# Patient Record
Sex: FEMALE | Race: WHITE | NOT HISPANIC OR LATINO
[De-identification: names, ages, dates, MRNs, and addresses within clinical notes are randomized per-mention and may not be internally consistent; named-entity substitution may affect disease eponyms.]

---

## 2017-07-12 ENCOUNTER — APPOINTMENT (OUTPATIENT)
Dept: ANTEPARTUM | Facility: CLINIC | Age: 32
End: 2017-07-12

## 2017-07-12 ENCOUNTER — ASOB RESULT (OUTPATIENT)
Age: 32
End: 2017-07-12

## 2017-11-25 ENCOUNTER — OUTPATIENT (OUTPATIENT)
Dept: OUTPATIENT SERVICES | Facility: HOSPITAL | Age: 32
LOS: 1 days | End: 2017-11-25
Payer: COMMERCIAL

## 2017-11-25 DIAGNOSIS — O26.899 OTHER SPECIFIED PREGNANCY RELATED CONDITIONS, UNSPECIFIED TRIMESTER: ICD-10-CM

## 2017-11-25 DIAGNOSIS — Z3A.00 WEEKS OF GESTATION OF PREGNANCY NOT SPECIFIED: ICD-10-CM

## 2017-11-25 PROCEDURE — 59025 FETAL NON-STRESS TEST: CPT

## 2017-11-25 PROCEDURE — G0463: CPT

## 2017-11-29 ENCOUNTER — INPATIENT (INPATIENT)
Facility: HOSPITAL | Age: 32
LOS: 2 days | Discharge: HOME | End: 2017-12-02
Attending: OBSTETRICS & GYNECOLOGY | Admitting: OBSTETRICS & GYNECOLOGY
Payer: COMMERCIAL

## 2017-11-29 VITALS — HEIGHT: 67 IN | WEIGHT: 158.73 LBS

## 2017-11-29 DIAGNOSIS — Z34.80 ENCOUNTER FOR SUPERVISION OF OTHER NORMAL PREGNANCY, UNSPECIFIED TRIMESTER: ICD-10-CM

## 2017-11-29 DIAGNOSIS — Z3A.00 WEEKS OF GESTATION OF PREGNANCY NOT SPECIFIED: ICD-10-CM

## 2017-11-29 DIAGNOSIS — O26.899 OTHER SPECIFIED PREGNANCY RELATED CONDITIONS, UNSPECIFIED TRIMESTER: ICD-10-CM

## 2017-11-29 LAB
BASOPHILS # BLD AUTO: 0 K/UL — SIGNIFICANT CHANGE UP (ref 0–0.2)
BASOPHILS NFR BLD AUTO: 0.2 % — SIGNIFICANT CHANGE UP (ref 0–2)
BLD GP AB SCN SERPL QL: NEGATIVE — SIGNIFICANT CHANGE UP
EOSINOPHIL # BLD AUTO: 0 K/UL — SIGNIFICANT CHANGE UP (ref 0–0.5)
EOSINOPHIL NFR BLD AUTO: 0.3 % — SIGNIFICANT CHANGE UP (ref 0–6)
HCT VFR BLD CALC: 43.7 % — SIGNIFICANT CHANGE UP (ref 34.5–45)
HGB BLD-MCNC: 15.3 G/DL — SIGNIFICANT CHANGE UP (ref 11.5–15.5)
LYMPHOCYTES # BLD AUTO: 1.7 K/UL — SIGNIFICANT CHANGE UP (ref 1–3.3)
LYMPHOCYTES # BLD AUTO: 9.4 % — LOW (ref 13–44)
MCHC RBC-ENTMCNC: 32.7 PG — SIGNIFICANT CHANGE UP (ref 27–34)
MCHC RBC-ENTMCNC: 35 GM/DL — SIGNIFICANT CHANGE UP (ref 32–36)
MCV RBC AUTO: 93.6 FL — SIGNIFICANT CHANGE UP (ref 80–100)
MONOCYTES # BLD AUTO: 1.3 K/UL — HIGH (ref 0–0.9)
MONOCYTES NFR BLD AUTO: 7.3 % — SIGNIFICANT CHANGE UP (ref 2–14)
NEUTROPHILS # BLD AUTO: 15.1 K/UL — HIGH (ref 1.8–7.4)
NEUTROPHILS NFR BLD AUTO: 82.8 % — HIGH (ref 43–77)
PLATELET # BLD AUTO: 183 K/UL — SIGNIFICANT CHANGE UP (ref 150–400)
RBC # BLD: 4.67 M/UL — SIGNIFICANT CHANGE UP (ref 3.8–5.2)
RBC # FLD: 11.8 % — SIGNIFICANT CHANGE UP (ref 10.3–14.5)
RH IG SCN BLD-IMP: POSITIVE — SIGNIFICANT CHANGE UP
WBC # BLD: 18.2 K/UL — HIGH (ref 3.8–10.5)
WBC # FLD AUTO: 18.2 K/UL — HIGH (ref 3.8–10.5)

## 2017-11-29 RX ORDER — SODIUM CHLORIDE 9 MG/ML
500 INJECTION, SOLUTION INTRAVENOUS ONCE
Qty: 0 | Refills: 0 | Status: COMPLETED | OUTPATIENT
Start: 2017-11-29 | End: 2017-11-29

## 2017-11-29 RX ORDER — OXYTOCIN 10 UNIT/ML
333.33 VIAL (ML) INJECTION
Qty: 20 | Refills: 0 | Status: COMPLETED | OUTPATIENT
Start: 2017-11-29

## 2017-11-29 RX ORDER — CITRIC ACID/SODIUM CITRATE 300-500 MG
15 SOLUTION, ORAL ORAL EVERY 4 HOURS
Qty: 0 | Refills: 0 | Status: DISCONTINUED | OUTPATIENT
Start: 2017-11-29 | End: 2017-11-30

## 2017-11-29 RX ORDER — SODIUM CHLORIDE 9 MG/ML
1000 INJECTION, SOLUTION INTRAVENOUS
Qty: 0 | Refills: 0 | Status: DISCONTINUED | OUTPATIENT
Start: 2017-11-29 | End: 2017-11-30

## 2017-11-29 RX ORDER — OXYTOCIN 10 UNIT/ML
333.33 VIAL (ML) INJECTION
Qty: 20 | Refills: 0 | Status: COMPLETED | OUTPATIENT
Start: 2017-11-29 | End: 2017-11-29

## 2017-11-29 RX ADMIN — SODIUM CHLORIDE 125 MILLILITER(S): 9 INJECTION, SOLUTION INTRAVENOUS at 19:55

## 2017-11-29 RX ADMIN — SODIUM CHLORIDE 1000 MILLILITER(S): 9 INJECTION, SOLUTION INTRAVENOUS at 19:10

## 2017-11-29 RX ADMIN — SODIUM CHLORIDE 125 MILLILITER(S): 9 INJECTION, SOLUTION INTRAVENOUS at 19:40

## 2017-11-30 LAB
APTT BLD: 25.6 SEC — LOW (ref 27.5–37.4)
BASOPHILS # BLD AUTO: 0.1 K/UL — SIGNIFICANT CHANGE UP (ref 0–0.2)
EOSINOPHIL # BLD AUTO: 0 K/UL — SIGNIFICANT CHANGE UP (ref 0–0.5)
FIBRINOGEN PPP-MCNC: 567 MG/DL — HIGH (ref 310–510)
HCT VFR BLD CALC: 31.3 % — LOW (ref 34.5–45)
HCT VFR BLD CALC: 34.2 % — LOW (ref 34.5–45)
HCT VFR BLD CALC: 35.4 % — SIGNIFICANT CHANGE UP (ref 34.5–45)
HGB BLD-MCNC: 11.2 G/DL — LOW (ref 11.5–15.5)
HGB BLD-MCNC: 11.6 G/DL — SIGNIFICANT CHANGE UP (ref 11.5–15.5)
HGB BLD-MCNC: 11.9 G/DL — SIGNIFICANT CHANGE UP (ref 11.5–15.5)
INR BLD: 1.05 RATIO — SIGNIFICANT CHANGE UP (ref 0.88–1.16)
LACTATE SERPL-SCNC: 2.2 MMOL/L — HIGH (ref 0.7–2)
LYMPHOCYTES # BLD AUTO: 1.2 K/UL — SIGNIFICANT CHANGE UP (ref 1–3.3)
LYMPHOCYTES # BLD AUTO: 8 % — LOW (ref 13–44)
MCHC RBC-ENTMCNC: 31.4 PG — SIGNIFICANT CHANGE UP (ref 27–34)
MCHC RBC-ENTMCNC: 31.6 PG — SIGNIFICANT CHANGE UP (ref 27–34)
MCHC RBC-ENTMCNC: 33.4 PG — SIGNIFICANT CHANGE UP (ref 27–34)
MCHC RBC-ENTMCNC: 33.6 GM/DL — SIGNIFICANT CHANGE UP (ref 32–36)
MCHC RBC-ENTMCNC: 33.9 GM/DL — SIGNIFICANT CHANGE UP (ref 32–36)
MCHC RBC-ENTMCNC: 35.7 GM/DL — SIGNIFICANT CHANGE UP (ref 32–36)
MCV RBC AUTO: 93.3 FL — SIGNIFICANT CHANGE UP (ref 80–100)
MCV RBC AUTO: 93.4 FL — SIGNIFICANT CHANGE UP (ref 80–100)
MCV RBC AUTO: 93.5 FL — SIGNIFICANT CHANGE UP (ref 80–100)
MONOCYTES # BLD AUTO: 2.1 K/UL — HIGH (ref 0–0.9)
MONOCYTES NFR BLD AUTO: 6 % — SIGNIFICANT CHANGE UP (ref 2–14)
NEUTROPHILS # BLD AUTO: 20.1 K/UL — HIGH (ref 1.8–7.4)
NEUTROPHILS NFR BLD AUTO: 84 % — HIGH (ref 43–77)
PLATELET # BLD AUTO: 150 K/UL — SIGNIFICANT CHANGE UP (ref 150–400)
PLATELET # BLD AUTO: 154 K/UL — SIGNIFICANT CHANGE UP (ref 150–400)
PLATELET # BLD AUTO: 154 K/UL — SIGNIFICANT CHANGE UP (ref 150–400)
PROTHROM AB SERPL-ACNC: 11.3 SEC — SIGNIFICANT CHANGE UP (ref 9.8–12.7)
RBC # BLD: 3.35 M/UL — LOW (ref 3.8–5.2)
RBC # BLD: 3.66 M/UL — LOW (ref 3.8–5.2)
RBC # BLD: 3.79 M/UL — LOW (ref 3.8–5.2)
RBC # FLD: 11.6 % — SIGNIFICANT CHANGE UP (ref 10.3–14.5)
RBC # FLD: 11.7 % — SIGNIFICANT CHANGE UP (ref 10.3–14.5)
RBC # FLD: 11.7 % — SIGNIFICANT CHANGE UP (ref 10.3–14.5)
T PALLIDUM AB TITR SER: NEGATIVE — SIGNIFICANT CHANGE UP
WBC # BLD: 22.2 K/UL — HIGH (ref 3.8–10.5)
WBC # BLD: 23 K/UL — HIGH (ref 3.8–10.5)
WBC # BLD: 23.3 K/UL — HIGH (ref 3.8–10.5)
WBC # FLD AUTO: 22.2 K/UL — HIGH (ref 3.8–10.5)
WBC # FLD AUTO: 23 K/UL — HIGH (ref 3.8–10.5)
WBC # FLD AUTO: 23.3 K/UL — HIGH (ref 3.8–10.5)

## 2017-11-30 RX ORDER — CARBOPROST TROMETHAMINE 250 UG/ML
250 INJECTION, SOLUTION INTRAMUSCULAR ONCE
Qty: 0 | Refills: 0 | Status: COMPLETED | OUTPATIENT
Start: 2017-11-30 | End: 2017-11-30

## 2017-11-30 RX ORDER — AER TRAVELER 0.5 G/1
1 SOLUTION RECTAL; TOPICAL EVERY 4 HOURS
Qty: 0 | Refills: 0 | Status: DISCONTINUED | OUTPATIENT
Start: 2017-11-30 | End: 2017-11-30

## 2017-11-30 RX ORDER — LANOLIN
1 OINTMENT (GRAM) TOPICAL EVERY 6 HOURS
Qty: 0 | Refills: 0 | Status: DISCONTINUED | OUTPATIENT
Start: 2017-11-30 | End: 2017-12-02

## 2017-11-30 RX ORDER — OXYCODONE HYDROCHLORIDE 5 MG/1
5 TABLET ORAL
Qty: 0 | Refills: 0 | Status: DISCONTINUED | OUTPATIENT
Start: 2017-11-30 | End: 2017-12-02

## 2017-11-30 RX ORDER — PRAMOXINE HYDROCHLORIDE 150 MG/15G
1 AEROSOL, FOAM RECTAL EVERY 4 HOURS
Qty: 0 | Refills: 0 | Status: DISCONTINUED | OUTPATIENT
Start: 2017-11-30 | End: 2017-11-30

## 2017-11-30 RX ORDER — DOCUSATE SODIUM 100 MG
100 CAPSULE ORAL
Qty: 0 | Refills: 0 | Status: DISCONTINUED | OUTPATIENT
Start: 2017-11-30 | End: 2017-12-02

## 2017-11-30 RX ORDER — HYDROCORTISONE 1 %
1 OINTMENT (GRAM) TOPICAL EVERY 4 HOURS
Qty: 0 | Refills: 0 | Status: DISCONTINUED | OUTPATIENT
Start: 2017-11-30 | End: 2017-12-02

## 2017-11-30 RX ORDER — OXYTOCIN 10 UNIT/ML
41.67 VIAL (ML) INJECTION
Qty: 20 | Refills: 0 | Status: DISCONTINUED | OUTPATIENT
Start: 2017-11-30 | End: 2017-12-02

## 2017-11-30 RX ORDER — PRAMOXINE HYDROCHLORIDE 150 MG/15G
1 AEROSOL, FOAM RECTAL EVERY 4 HOURS
Qty: 0 | Refills: 0 | Status: DISCONTINUED | OUTPATIENT
Start: 2017-11-30 | End: 2017-12-02

## 2017-11-30 RX ORDER — ACETAMINOPHEN 500 MG
975 TABLET ORAL EVERY 6 HOURS
Qty: 0 | Refills: 0 | Status: DISCONTINUED | OUTPATIENT
Start: 2017-11-30 | End: 2017-12-02

## 2017-11-30 RX ORDER — DIPHENHYDRAMINE HCL 50 MG
25 CAPSULE ORAL EVERY 6 HOURS
Qty: 0 | Refills: 0 | Status: DISCONTINUED | OUTPATIENT
Start: 2017-11-30 | End: 2017-12-02

## 2017-11-30 RX ORDER — SODIUM CHLORIDE 9 MG/ML
3 INJECTION INTRAMUSCULAR; INTRAVENOUS; SUBCUTANEOUS EVERY 8 HOURS
Qty: 0 | Refills: 0 | Status: DISCONTINUED | OUTPATIENT
Start: 2017-11-30 | End: 2017-11-30

## 2017-11-30 RX ORDER — AER TRAVELER 0.5 G/1
1 SOLUTION RECTAL; TOPICAL EVERY 4 HOURS
Qty: 0 | Refills: 0 | Status: DISCONTINUED | OUTPATIENT
Start: 2017-11-30 | End: 2017-12-02

## 2017-11-30 RX ORDER — HYDROCORTISONE 1 %
1 OINTMENT (GRAM) TOPICAL EVERY 4 HOURS
Qty: 0 | Refills: 0 | Status: DISCONTINUED | OUTPATIENT
Start: 2017-11-30 | End: 2017-11-30

## 2017-11-30 RX ORDER — KETOROLAC TROMETHAMINE 30 MG/ML
30 SYRINGE (ML) INJECTION ONCE
Qty: 0 | Refills: 0 | Status: DISCONTINUED | OUTPATIENT
Start: 2017-11-30 | End: 2017-12-02

## 2017-11-30 RX ORDER — ONDANSETRON 8 MG/1
4 TABLET, FILM COATED ORAL ONCE
Qty: 0 | Refills: 0 | Status: DISCONTINUED | OUTPATIENT
Start: 2017-11-30 | End: 2017-12-02

## 2017-11-30 RX ORDER — SIMETHICONE 80 MG/1
80 TABLET, CHEWABLE ORAL EVERY 6 HOURS
Qty: 0 | Refills: 0 | Status: DISCONTINUED | OUTPATIENT
Start: 2017-11-30 | End: 2017-12-02

## 2017-11-30 RX ORDER — GLYCERIN ADULT
1 SUPPOSITORY, RECTAL RECTAL AT BEDTIME
Qty: 0 | Refills: 0 | Status: DISCONTINUED | OUTPATIENT
Start: 2017-11-30 | End: 2017-12-02

## 2017-11-30 RX ORDER — DIBUCAINE 1 %
1 OINTMENT (GRAM) RECTAL EVERY 4 HOURS
Qty: 0 | Refills: 0 | Status: DISCONTINUED | OUTPATIENT
Start: 2017-11-30 | End: 2017-12-02

## 2017-11-30 RX ORDER — OXYCODONE HYDROCHLORIDE 5 MG/1
5 TABLET ORAL EVERY 4 HOURS
Qty: 0 | Refills: 0 | Status: DISCONTINUED | OUTPATIENT
Start: 2017-11-30 | End: 2017-12-02

## 2017-11-30 RX ORDER — ACETAMINOPHEN 500 MG
975 TABLET ORAL EVERY 6 HOURS
Qty: 0 | Refills: 0 | Status: COMPLETED | OUTPATIENT
Start: 2017-11-30 | End: 2018-10-29

## 2017-11-30 RX ORDER — DIPHENOXYLATE HCL/ATROPINE 2.5-.025MG
1 TABLET ORAL ONCE
Qty: 0 | Refills: 0 | Status: DISCONTINUED | OUTPATIENT
Start: 2017-11-30 | End: 2017-11-30

## 2017-11-30 RX ORDER — DIBUCAINE 1 %
1 OINTMENT (GRAM) RECTAL EVERY 4 HOURS
Qty: 0 | Refills: 0 | Status: DISCONTINUED | OUTPATIENT
Start: 2017-11-30 | End: 2017-11-30

## 2017-11-30 RX ORDER — SODIUM CHLORIDE 9 MG/ML
1000 INJECTION, SOLUTION INTRAVENOUS ONCE
Qty: 0 | Refills: 0 | Status: COMPLETED | OUTPATIENT
Start: 2017-11-30 | End: 2017-11-30

## 2017-11-30 RX ORDER — TETANUS TOXOID, REDUCED DIPHTHERIA TOXOID AND ACELLULAR PERTUSSIS VACCINE, ADSORBED 5; 2.5; 8; 8; 2.5 [IU]/.5ML; [IU]/.5ML; UG/.5ML; UG/.5ML; UG/.5ML
0.5 SUSPENSION INTRAMUSCULAR ONCE
Qty: 0 | Refills: 0 | Status: DISCONTINUED | OUTPATIENT
Start: 2017-11-30 | End: 2017-12-02

## 2017-11-30 RX ORDER — SODIUM CHLORIDE 9 MG/ML
3 INJECTION INTRAMUSCULAR; INTRAVENOUS; SUBCUTANEOUS EVERY 8 HOURS
Qty: 0 | Refills: 0 | Status: DISCONTINUED | OUTPATIENT
Start: 2017-11-30 | End: 2017-12-02

## 2017-11-30 RX ORDER — IBUPROFEN 200 MG
600 TABLET ORAL EVERY 6 HOURS
Qty: 0 | Refills: 0 | Status: COMPLETED | OUTPATIENT
Start: 2017-11-30 | End: 2018-10-29

## 2017-11-30 RX ORDER — MAGNESIUM HYDROXIDE 400 MG/1
30 TABLET, CHEWABLE ORAL
Qty: 0 | Refills: 0 | Status: DISCONTINUED | OUTPATIENT
Start: 2017-11-30 | End: 2017-12-02

## 2017-11-30 RX ORDER — TRANEXAMIC ACID 100 MG/ML
1000 INJECTION, SOLUTION INTRAVENOUS ONCE
Qty: 0 | Refills: 0 | Status: COMPLETED | OUTPATIENT
Start: 2017-11-30 | End: 2017-11-30

## 2017-11-30 RX ADMIN — Medication 0.2 MILLIGRAM(S): at 23:19

## 2017-11-30 RX ADMIN — Medication 975 MILLIGRAM(S): at 18:49

## 2017-11-30 RX ADMIN — Medication 125 MILLIUNIT(S)/MIN: at 01:50

## 2017-11-30 RX ADMIN — Medication 0.2 MILLIGRAM(S): at 15:06

## 2017-11-30 RX ADMIN — Medication 1 TABLET(S): at 04:08

## 2017-11-30 RX ADMIN — OXYCODONE HYDROCHLORIDE 5 MILLIGRAM(S): 5 TABLET ORAL at 09:30

## 2017-11-30 RX ADMIN — OXYCODONE HYDROCHLORIDE 5 MILLIGRAM(S): 5 TABLET ORAL at 12:45

## 2017-11-30 RX ADMIN — Medication 1000 MILLIUNIT(S)/MIN: at 01:10

## 2017-11-30 RX ADMIN — Medication 0.2 MILLIGRAM(S): at 10:48

## 2017-11-30 RX ADMIN — OXYCODONE HYDROCHLORIDE 5 MILLIGRAM(S): 5 TABLET ORAL at 12:15

## 2017-11-30 RX ADMIN — OXYCODONE HYDROCHLORIDE 5 MILLIGRAM(S): 5 TABLET ORAL at 22:00

## 2017-11-30 RX ADMIN — OXYCODONE HYDROCHLORIDE 5 MILLIGRAM(S): 5 TABLET ORAL at 18:55

## 2017-11-30 RX ADMIN — Medication 975 MILLIGRAM(S): at 02:07

## 2017-11-30 RX ADMIN — CARBOPROST TROMETHAMINE 250 MICROGRAM(S): 250 INJECTION, SOLUTION INTRAMUSCULAR at 03:26

## 2017-11-30 RX ADMIN — OXYCODONE HYDROCHLORIDE 5 MILLIGRAM(S): 5 TABLET ORAL at 02:54

## 2017-11-30 RX ADMIN — TRANEXAMIC ACID 220 MILLIGRAM(S): 100 INJECTION, SOLUTION INTRAVENOUS at 04:28

## 2017-11-30 RX ADMIN — SODIUM CHLORIDE 3 MILLILITER(S): 9 INJECTION INTRAMUSCULAR; INTRAVENOUS; SUBCUTANEOUS at 23:19

## 2017-11-30 RX ADMIN — Medication 0.2 MILLIGRAM(S): at 18:55

## 2017-11-30 RX ADMIN — OXYCODONE HYDROCHLORIDE 5 MILLIGRAM(S): 5 TABLET ORAL at 08:58

## 2017-11-30 RX ADMIN — SODIUM CHLORIDE 2000 MILLILITER(S): 9 INJECTION, SOLUTION INTRAVENOUS at 03:50

## 2017-11-30 RX ADMIN — OXYCODONE HYDROCHLORIDE 5 MILLIGRAM(S): 5 TABLET ORAL at 15:46

## 2017-11-30 RX ADMIN — OXYCODONE HYDROCHLORIDE 5 MILLIGRAM(S): 5 TABLET ORAL at 21:28

## 2017-11-30 RX ADMIN — PRAMOXINE HYDROCHLORIDE 1 APPLICATION(S): 150 AEROSOL, FOAM RECTAL at 17:50

## 2017-11-30 RX ADMIN — Medication 0.2 MILLIGRAM(S): at 06:53

## 2017-11-30 RX ADMIN — Medication 0.2 MILLIGRAM(S): at 02:47

## 2017-11-30 NOTE — CHART NOTE - NSCHARTNOTEFT_GEN_A_CORE
Pt seen and examined for extra vaginal bleeding. Pt without complaints at this time.    VS  T(C): 38.3 (17 @ 01:40)  HR: 108 (17 @ 01:40)  BP: 132/67 (17 @ 01:40)  RR: 18 (17 @ 01:40)  SpO2: 98% (17 @ 01:40)    Physical Exam:  General: NAD  Abdomen/Pelvic: Soft, non-tender, non-distended, fundus originally slightly above umbilicus, internal exam performed and clots expressed, total EBL from exam 300 mL, fundus noted to be firm and slightly below umbilicus after clot expression  Sono: Thin endometrial stripe with no evidence of retained products after examination    A/P:  Recently s/p  with EBL of 350, now with 300 mL PPH for total EBL of 650 mL  - Methergine 0.2 mg IM given now  - Will continue with methergine series for 24 hours  - Continue to monitor    d/w Dr. Worthington-Marito Willis, PGY-2

## 2017-11-30 NOTE — PROVIDER CONTACT NOTE (OTHER) - BACKGROUND
EBL- 350 ml, with additional ebl-300 ml and methergine series.
EBL=350 mL at delivery. Pitocin Bag #2.

## 2017-11-30 NOTE — PROVIDER CONTACT NOTE (OTHER) - ASSESSMENT
uterus midline, 1 below. 3-4 golf ball sized clots expressed with moderate bleeding.
pt midline fundus firm on palpation, pt sitting in pool of blood.
uterus midline and firm on palpation, pt appears to be sitting in pool of blood.

## 2017-12-01 LAB
HCT VFR BLD CALC: 26.5 % — LOW (ref 34.5–45)
HGB BLD-MCNC: 9.2 G/DL — LOW (ref 11.5–15.5)
LACTATE SERPL-SCNC: 1 MMOL/L — SIGNIFICANT CHANGE UP (ref 0.7–2)

## 2017-12-01 RX ORDER — ASCORBIC ACID 60 MG
500 TABLET,CHEWABLE ORAL DAILY
Qty: 0 | Refills: 0 | Status: DISCONTINUED | OUTPATIENT
Start: 2017-12-01 | End: 2017-12-02

## 2017-12-01 RX ORDER — FERROUS SULFATE 325(65) MG
325 TABLET ORAL
Qty: 0 | Refills: 0 | Status: DISCONTINUED | OUTPATIENT
Start: 2017-12-01 | End: 2017-12-02

## 2017-12-01 RX ORDER — IBUPROFEN 200 MG
600 TABLET ORAL EVERY 6 HOURS
Qty: 0 | Refills: 0 | Status: DISCONTINUED | OUTPATIENT
Start: 2017-12-01 | End: 2017-12-02

## 2017-12-01 RX ORDER — DOCUSATE SODIUM 100 MG
100 CAPSULE ORAL THREE TIMES A DAY
Qty: 0 | Refills: 0 | Status: DISCONTINUED | OUTPATIENT
Start: 2017-12-01 | End: 2017-12-02

## 2017-12-01 RX ADMIN — Medication 975 MILLIGRAM(S): at 13:00

## 2017-12-01 RX ADMIN — Medication 100 MILLIGRAM(S): at 15:18

## 2017-12-01 RX ADMIN — SODIUM CHLORIDE 3 MILLILITER(S): 9 INJECTION INTRAMUSCULAR; INTRAVENOUS; SUBCUTANEOUS at 05:27

## 2017-12-01 RX ADMIN — OXYCODONE HYDROCHLORIDE 5 MILLIGRAM(S): 5 TABLET ORAL at 18:03

## 2017-12-01 RX ADMIN — Medication 975 MILLIGRAM(S): at 12:22

## 2017-12-01 RX ADMIN — Medication 500 MILLIGRAM(S): at 12:24

## 2017-12-01 RX ADMIN — OXYCODONE HYDROCHLORIDE 5 MILLIGRAM(S): 5 TABLET ORAL at 09:30

## 2017-12-01 RX ADMIN — OXYCODONE HYDROCHLORIDE 5 MILLIGRAM(S): 5 TABLET ORAL at 01:30

## 2017-12-01 RX ADMIN — OXYCODONE HYDROCHLORIDE 5 MILLIGRAM(S): 5 TABLET ORAL at 18:39

## 2017-12-01 RX ADMIN — OXYCODONE HYDROCHLORIDE 5 MILLIGRAM(S): 5 TABLET ORAL at 12:21

## 2017-12-01 RX ADMIN — Medication 975 MILLIGRAM(S): at 01:30

## 2017-12-01 RX ADMIN — Medication 975 MILLIGRAM(S): at 06:00

## 2017-12-01 RX ADMIN — Medication 1 TABLET(S): at 12:22

## 2017-12-01 RX ADMIN — OXYCODONE HYDROCHLORIDE 5 MILLIGRAM(S): 5 TABLET ORAL at 09:05

## 2017-12-01 RX ADMIN — Medication 975 MILLIGRAM(S): at 18:03

## 2017-12-01 RX ADMIN — Medication 975 MILLIGRAM(S): at 05:23

## 2017-12-01 RX ADMIN — Medication 100 MILLIGRAM(S): at 05:25

## 2017-12-01 RX ADMIN — OXYCODONE HYDROCHLORIDE 5 MILLIGRAM(S): 5 TABLET ORAL at 13:00

## 2017-12-01 RX ADMIN — Medication 325 MILLIGRAM(S): at 12:22

## 2017-12-01 RX ADMIN — Medication 975 MILLIGRAM(S): at 00:48

## 2017-12-01 RX ADMIN — OXYCODONE HYDROCHLORIDE 5 MILLIGRAM(S): 5 TABLET ORAL at 05:24

## 2017-12-01 RX ADMIN — Medication 975 MILLIGRAM(S): at 18:39

## 2017-12-01 RX ADMIN — OXYCODONE HYDROCHLORIDE 5 MILLIGRAM(S): 5 TABLET ORAL at 06:00

## 2017-12-01 RX ADMIN — Medication 325 MILLIGRAM(S): at 18:03

## 2017-12-01 RX ADMIN — OXYCODONE HYDROCHLORIDE 5 MILLIGRAM(S): 5 TABLET ORAL at 00:48

## 2017-12-01 NOTE — CHART NOTE - NSCHARTNOTEFT_GEN_A_CORE
DELIA ACHARYA Postpartum    Day 1         Vital Signs Last 24 Hrs  T(C): 36.7 (01 Dec 2017 17:19), Max: 37.2 (2017 18:28)  T(F): 98.1 (01 Dec 2017 17:19), Max: 99 (2017 18:28)  HR: 79 (01 Dec 2017 17:19) (75 - 112)  BP: 125/84 (01 Dec 2017 17:19) (107/74 - 125/84)  RR: 18 (01 Dec 2017 17:19) (18 - 18)  SpO2: 99% (01 Dec 2017 17:19) (98% - 100%)                          9.2    x     )-----------( x        ( 01 Dec 2017 07:06 )             26.5   baso x      eos x      imm gran x      lymph x      mono x      poly x                            11.2   23.0  )-----------( 154      ( 2017 10:35 )             31.3   baso x      eos x      imm gran x      lymph x      mono x      poly x                            11.9   23.3  )-----------( 154      ( 2017 04:03 )             35.4   baso x      eos x      imm gran x      lymph x      mono x      poly x                            11.6   22.2  )-----------( 150      ( 2017 03:37 )             34.2   baso x      eos x      imm gran x      lymph 8.0    mono 6.0    poly 84.0                         15.3   18.2  )-----------( 183      ( 2017 19:38 )             43.7   baso 0.2    eos 0.3    imm gran x      lymph 9.4    mono 7.3    poly 82.8         Plan:  - Ferrous Sulfate, Colace, Vitamin C supplementation.  - Monitor for signs/symptoms of anemia.

## 2017-12-01 NOTE — PROGRESS NOTE ADULT - PROBLEM SELECTOR PLAN 1
- Continue with po analgesia  - Increase ambulation  - Continue regular diet  - IV lock  - H&H today  - PPH of 700 after delivery, total EBL 1050: Patient s/p hemabate, methergine IM, cytotec and TXA. Continues on methergine series until this morning. Will continue to monitor VS.    BRENDA Moraes, PGY-1

## 2017-12-01 NOTE — PROGRESS NOTE ADULT - SUBJECTIVE AND OBJECTIVE BOX
Patient seen and examined at bedside, no acute overnight events. No acute complaints, pain well controlled. Denies any HA, blurry vision, dizziness, CP/SOB, N/V. Patient is ambulating, voiding spontaneously, passing gas, and tolerating regular diet. Pt is breast feeding her baby.    Vital Signs Last 24 Hours  T(C): 36.6 (12-01-17 @ 01:00), Max: 37.4 (11-30-17 @ 16:30)  HR: 75 (12-01-17 @ 01:00) (75 - 112)  BP: 107/74 (12-01-17 @ 01:00) (97/55 - 134/78)  RR: 18 (12-01-17 @ 01:00) (17 - 18)  SpO2: 99% (12-01-17 @ 01:00) (94% - 100%)    Physical Exam:  General: NAD  CV: RRR  Pulm: CTAB  Abdomen: Soft, non-tender, non-distended  Pelvic: Lochia wnl; fundus firm and non-tender   Extremities: no calf tenderness noted on exam    Labs:    Blood Type: A Positive  Antibody Screen: Negative  RPR: Negative               11.2   23.0  )-----------( 154      ( 11-30 @ 10:35 )             31.3                11.9   23.3  )-----------( 154      ( 11-30 @ 04:03 )             35.4                11.6   22.2  )-----------( 150      ( 11-30 @ 03:37 )             34.2         MEDICATIONS  (STANDING):  acetaminophen   Tablet. 975 milliGRAM(s) Oral every 6 hours  diphtheria/tetanus/pertussis (acellular) Vaccine (ADAcel) 0.5 milliLiter(s) IntraMuscular once  ibuprofen  Tablet 600 milliGRAM(s) Oral every 6 hours  ketorolac   Injectable 30 milliGRAM(s) IV Push once  methylergonovine 0.2 milliGRAM(s) Oral every 4 hours  methylergonovine Injectable 0.2 milliGRAM(s) IntraMuscular once  ondansetron Injectable 4 milliGRAM(s) IV Push once  oxyCODONE    IR 5 milliGRAM(s) Oral every 3 hours  oxytocin Infusion 41.667 milliUNIT(s)/Min (125 mL/Hr) IV Continuous <Continuous>  oxytocin Infusion 41.667 milliUNIT(s)/Min (125 mL/Hr) IV Continuous <Continuous>  prenatal multivitamin 1 Tablet(s) Oral daily  sodium chloride 0.9% lock flush 3 milliLiter(s) IV Push every 8 hours    MEDICATIONS  (PRN):  dibucaine 1% Ointment 1 Application(s) Topical every 4 hours PRN Perineal Discomfort  diphenhydrAMINE   Capsule 25 milliGRAM(s) Oral every 6 hours PRN Itching  docusate sodium 100 milliGRAM(s) Oral two times a day PRN Stool Softening  glycerin Suppository - Adult 1 Suppository(s) Rectal at bedtime PRN Constipation  hydrocortisone 1% Cream 1 Application(s) Topical every 4 hours PRN Moderate to Severe Perineal Pain  lanolin Ointment 1 Application(s) Topical every 6 hours PRN Sore Nipples  magnesium hydroxide Suspension 30 milliLiter(s) Oral two times a day PRN Constipation  oxyCODONE    IR 5 milliGRAM(s) Oral every 4 hours PRN Severe Pain (7 -10)  pramoxine 1%/zinc 5% Cream 1 Application(s) Topical every 4 hours PRN Moderate to Severe Perineal Pain  simethicone 80 milliGRAM(s) Chew every 6 hours PRN Gas  witch hazel Pads 1 Application(s) Topical every 4 hours PRN Perineal Discomfort

## 2017-12-02 ENCOUNTER — TRANSCRIPTION ENCOUNTER (OUTPATIENT)
Age: 32
End: 2017-12-02

## 2017-12-02 VITALS
DIASTOLIC BLOOD PRESSURE: 87 MMHG | SYSTOLIC BLOOD PRESSURE: 126 MMHG | HEART RATE: 82 BPM | OXYGEN SATURATION: 98 % | RESPIRATION RATE: 18 BRPM | TEMPERATURE: 98 F

## 2017-12-02 PROCEDURE — 85610 PROTHROMBIN TIME: CPT

## 2017-12-02 PROCEDURE — 86900 BLOOD TYPING SEROLOGIC ABO: CPT

## 2017-12-02 PROCEDURE — 85730 THROMBOPLASTIN TIME PARTIAL: CPT

## 2017-12-02 PROCEDURE — 86780 TREPONEMA PALLIDUM: CPT

## 2017-12-02 PROCEDURE — 59050 FETAL MONITOR W/REPORT: CPT

## 2017-12-02 PROCEDURE — 85384 FIBRINOGEN ACTIVITY: CPT

## 2017-12-02 PROCEDURE — 86850 RBC ANTIBODY SCREEN: CPT

## 2017-12-02 PROCEDURE — G0463: CPT

## 2017-12-02 PROCEDURE — 85018 HEMOGLOBIN: CPT

## 2017-12-02 PROCEDURE — 86901 BLOOD TYPING SEROLOGIC RH(D): CPT

## 2017-12-02 PROCEDURE — 83605 ASSAY OF LACTIC ACID: CPT

## 2017-12-02 PROCEDURE — 59025 FETAL NON-STRESS TEST: CPT

## 2017-12-02 PROCEDURE — 85027 COMPLETE CBC AUTOMATED: CPT

## 2017-12-02 RX ORDER — IBUPROFEN 200 MG
1 TABLET ORAL
Qty: 0 | Refills: 0 | DISCHARGE
Start: 2017-12-02

## 2017-12-02 RX ORDER — ACETAMINOPHEN 500 MG
3 TABLET ORAL
Qty: 0 | Refills: 0 | DISCHARGE
Start: 2017-12-02

## 2017-12-02 RX ADMIN — Medication 100 MILLIGRAM(S): at 07:44

## 2017-12-02 RX ADMIN — Medication 600 MILLIGRAM(S): at 08:12

## 2017-12-02 RX ADMIN — Medication 600 MILLIGRAM(S): at 07:44

## 2017-12-02 RX ADMIN — Medication 975 MILLIGRAM(S): at 11:00

## 2017-12-02 RX ADMIN — Medication 100 MILLIGRAM(S): at 01:15

## 2017-12-02 RX ADMIN — Medication 975 MILLIGRAM(S): at 05:20

## 2017-12-02 RX ADMIN — Medication 325 MILLIGRAM(S): at 07:44

## 2017-12-02 RX ADMIN — Medication 975 MILLIGRAM(S): at 11:30

## 2017-12-02 NOTE — DISCHARGE NOTE OB - CARE PROVIDER_API CALL
[place], Melissa H (MD), Obstetrics and Gynecology  [address] [facility] [address] [place] [address]  Phone: [phone]  Fax: [phone]

## 2017-12-02 NOTE — PROGRESS NOTE ADULT - SUBJECTIVE AND OBJECTIVE BOX
Patient seen and examined at bedside, no acute overnight events. No acute complaints, pain well controlled. Denies any HA, blurry vision, dizziness, CP/SOB, N/V. Patient is ambulating, voiding spontaneously, passing gas, and tolerating regular diet. Pt is breast feeding her baby.    Vital Signs Last 24 Hours  T(C): 36.7 (12-01-17 @ 17:19), Max: 36.7 (12-01-17 @ 17:19)  HR: 79 (12-01-17 @ 17:19) (79 - 79)  BP: 125/84 (12-01-17 @ 17:19) (125/84 - 125/84)  RR: 18 (12-01-17 @ 17:19) (18 - 18)  SpO2: 99% (12-01-17 @ 17:19) (99% - 99%)    Physical Exam:  General: NAD  CV: RRR  Pulm: CTAB  Abdomen: Soft, non-tender, non-distended  Pelvic: Lochia wnl; fundus firm and non-tender   Extremities: no calf tenderness noted on exam    Labs:    Blood Type: A Positive  Antibody Screen: Negative  RPR: Negative               9.2    x     )-----------( x        ( 12-01 @ 07:06 )             26.5                11.2   23.0  )-----------( 154      ( 11-30 @ 10:35 )             31.3                11.9   23.3  )-----------( 154      ( 11-30 @ 04:03 )             35.4         MEDICATIONS  (STANDING):  acetaminophen   Tablet. 975 milliGRAM(s) Oral every 6 hours  ascorbic acid 500 milliGRAM(s) Oral daily  diphtheria/tetanus/pertussis (acellular) Vaccine (ADAcel) 0.5 milliLiter(s) IntraMuscular once  docusate sodium 100 milliGRAM(s) Oral three times a day  ferrous    sulfate 325 milliGRAM(s) Oral three times a day with meals  ibuprofen  Tablet 600 milliGRAM(s) Oral every 6 hours  ketorolac   Injectable 30 milliGRAM(s) IV Push once  methylergonovine 0.2 milliGRAM(s) Oral every 4 hours  methylergonovine Injectable 0.2 milliGRAM(s) IntraMuscular once  ondansetron Injectable 4 milliGRAM(s) IV Push once  oxyCODONE    IR 5 milliGRAM(s) Oral every 3 hours  oxytocin Infusion 41.667 milliUNIT(s)/Min (125 mL/Hr) IV Continuous <Continuous>  oxytocin Infusion 41.667 milliUNIT(s)/Min (125 mL/Hr) IV Continuous <Continuous>  prenatal multivitamin 1 Tablet(s) Oral daily  sodium chloride 0.9% lock flush 3 milliLiter(s) IV Push every 8 hours    MEDICATIONS  (PRN):  dibucaine 1% Ointment 1 Application(s) Topical every 4 hours PRN Perineal Discomfort  diphenhydrAMINE   Capsule 25 milliGRAM(s) Oral every 6 hours PRN Itching  docusate sodium 100 milliGRAM(s) Oral two times a day PRN Stool Softening  glycerin Suppository - Adult 1 Suppository(s) Rectal at bedtime PRN Constipation  hydrocortisone 1% Cream 1 Application(s) Topical every 4 hours PRN Moderate to Severe Perineal Pain  lanolin Ointment 1 Application(s) Topical every 6 hours PRN Sore Nipples  magnesium hydroxide Suspension 30 milliLiter(s) Oral two times a day PRN Constipation  oxyCODONE    IR 5 milliGRAM(s) Oral every 4 hours PRN Severe Pain (7 -10)  pramoxine 1%/zinc 5% Cream 1 Application(s) Topical every 4 hours PRN Moderate to Severe Perineal Pain  simethicone 80 milliGRAM(s) Chew every 6 hours PRN Gas  witch hazel Pads 1 Application(s) Topical every 4 hours PRN Perineal Discomfort

## 2017-12-02 NOTE — DISCHARGE NOTE OB - MEDICATION SUMMARY - MEDICATIONS TO TAKE
I will START or STAY ON the medications listed below when I get home from the hospital:    acetaminophen 325 mg oral tablet  -- 3 tab(s) by mouth every 6 hours  -- Indication: For Vaginal delivery     mg oral tablet  -- 1 tab(s) by mouth every 6 hours  -- Indication: For Vaginal delivery    Prenatal Multivitamins with Folic Acid 1 mg oral tablet  -- 1 tab(s) by mouth once a day  -- Indication: For Vaginal delivery

## 2017-12-02 NOTE — PROGRESS NOTE ADULT - PROBLEM SELECTOR PLAN 1
- Continue with po analgesia  - Increase ambulation  - Continue regular diet  - IV lock  - EBL 1050: Vital signs stable, patient asymptomatic, doing well. Will continue to monitor.    BRENDA Moraes, PGY-1

## 2017-12-02 NOTE — DISCHARGE NOTE OB - CARE PLAN
Principal Discharge DX:	Vaginal delivery  Goal:	return to prenatal state  Instructions for follow-up, activity and diet:	pelvic rest, limit activity regular diet

## 2017-12-02 NOTE — DISCHARGE NOTE OB - PATIENT PORTAL LINK FT
“You can access the Follow[facility] Patient Portal, offered by [facility], by registering with the following website: [email or web address]”

## 2019-06-17 ENCOUNTER — INPATIENT (INPATIENT)
Facility: HOSPITAL | Age: 34
LOS: 1 days | Discharge: HOME | End: 2019-06-19
Attending: OBSTETRICS & GYNECOLOGY | Admitting: OBSTETRICS & GYNECOLOGY
Payer: COMMERCIAL

## 2019-06-17 VITALS
RESPIRATION RATE: 18 BRPM | DIASTOLIC BLOOD PRESSURE: 77 MMHG | TEMPERATURE: 98 F | SYSTOLIC BLOOD PRESSURE: 111 MMHG | OXYGEN SATURATION: 98 % | HEART RATE: 100 BPM

## 2019-06-17 DIAGNOSIS — Z34.80 ENCOUNTER FOR SUPERVISION OF OTHER NORMAL PREGNANCY, UNSPECIFIED TRIMESTER: ICD-10-CM

## 2019-06-17 DIAGNOSIS — Z3A.00 WEEKS OF GESTATION OF PREGNANCY NOT SPECIFIED: ICD-10-CM

## 2019-06-17 DIAGNOSIS — O26.899 OTHER SPECIFIED PREGNANCY RELATED CONDITIONS, UNSPECIFIED TRIMESTER: ICD-10-CM

## 2019-06-17 LAB
BASOPHILS # BLD AUTO: 0 K/UL — SIGNIFICANT CHANGE UP (ref 0–0.2)
BASOPHILS NFR BLD AUTO: 0.4 % — SIGNIFICANT CHANGE UP (ref 0–2)
BLD GP AB SCN SERPL QL: NEGATIVE — SIGNIFICANT CHANGE UP
EOSINOPHIL # BLD AUTO: 0.1 K/UL — SIGNIFICANT CHANGE UP (ref 0–0.5)
EOSINOPHIL NFR BLD AUTO: 0.7 % — SIGNIFICANT CHANGE UP (ref 0–6)
HCT VFR BLD CALC: 35.6 % — SIGNIFICANT CHANGE UP (ref 34.5–45)
HGB BLD-MCNC: 12.7 G/DL — SIGNIFICANT CHANGE UP (ref 11.5–15.5)
LYMPHOCYTES # BLD AUTO: 19.3 % — SIGNIFICANT CHANGE UP (ref 13–44)
LYMPHOCYTES # BLD AUTO: 2.6 K/UL — SIGNIFICANT CHANGE UP (ref 1–3.3)
MCHC RBC-ENTMCNC: 32.4 PG — SIGNIFICANT CHANGE UP (ref 27–34)
MCHC RBC-ENTMCNC: 35.6 GM/DL — SIGNIFICANT CHANGE UP (ref 32–36)
MCV RBC AUTO: 90.8 FL — SIGNIFICANT CHANGE UP (ref 80–100)
MONOCYTES # BLD AUTO: 1.4 K/UL — HIGH (ref 0–0.9)
MONOCYTES NFR BLD AUTO: 10.4 % — SIGNIFICANT CHANGE UP (ref 2–14)
NEUTROPHILS # BLD AUTO: 9.2 K/UL — HIGH (ref 1.8–7.4)
NEUTROPHILS NFR BLD AUTO: 69.3 % — SIGNIFICANT CHANGE UP (ref 43–77)
PLATELET # BLD AUTO: 195 K/UL — SIGNIFICANT CHANGE UP (ref 150–400)
RBC # BLD: 3.92 M/UL — SIGNIFICANT CHANGE UP (ref 3.8–5.2)
RBC # FLD: 11.8 % — SIGNIFICANT CHANGE UP (ref 10.3–14.5)
RH IG SCN BLD-IMP: POSITIVE — SIGNIFICANT CHANGE UP
T PALLIDUM AB TITR SER: NEGATIVE — SIGNIFICANT CHANGE UP
WBC # BLD: 13.3 K/UL — HIGH (ref 3.8–10.5)
WBC # FLD AUTO: 13.3 K/UL — HIGH (ref 3.8–10.5)

## 2019-06-17 RX ORDER — SODIUM CHLORIDE 9 MG/ML
1000 INJECTION, SOLUTION INTRAVENOUS
Refills: 0 | Status: DISCONTINUED | OUTPATIENT
Start: 2019-06-17 | End: 2019-06-19

## 2019-06-17 RX ORDER — OXYTOCIN 10 UNIT/ML
333.33 VIAL (ML) INJECTION
Qty: 20 | Refills: 0 | Status: DISCONTINUED | OUTPATIENT
Start: 2019-06-17 | End: 2019-06-19

## 2019-06-17 RX ORDER — ACETAMINOPHEN 500 MG
975 TABLET ORAL EVERY 6 HOURS
Refills: 0 | Status: DISCONTINUED | OUTPATIENT
Start: 2019-06-17 | End: 2019-06-19

## 2019-06-17 RX ORDER — SODIUM CHLORIDE 9 MG/ML
1000 INJECTION, SOLUTION INTRAVENOUS
Refills: 0 | Status: DISCONTINUED | OUTPATIENT
Start: 2019-06-17 | End: 2019-06-17

## 2019-06-17 RX ORDER — OXYCODONE HYDROCHLORIDE 5 MG/1
5 TABLET ORAL
Refills: 0 | Status: DISCONTINUED | OUTPATIENT
Start: 2019-06-17 | End: 2019-06-19

## 2019-06-17 RX ORDER — DOCUSATE SODIUM 100 MG
100 CAPSULE ORAL
Refills: 0 | Status: DISCONTINUED | OUTPATIENT
Start: 2019-06-17 | End: 2019-06-19

## 2019-06-17 RX ORDER — SIMETHICONE 80 MG/1
80 TABLET, CHEWABLE ORAL EVERY 4 HOURS
Refills: 0 | Status: DISCONTINUED | OUTPATIENT
Start: 2019-06-17 | End: 2019-06-19

## 2019-06-17 RX ORDER — CITRIC ACID/SODIUM CITRATE 300-500 MG
15 SOLUTION, ORAL ORAL EVERY 6 HOURS
Refills: 0 | Status: DISCONTINUED | OUTPATIENT
Start: 2019-06-17 | End: 2019-06-17

## 2019-06-17 RX ORDER — OXYTOCIN 10 UNIT/ML
2 VIAL (ML) INJECTION
Qty: 30 | Refills: 0 | Status: DISCONTINUED | OUTPATIENT
Start: 2019-06-17 | End: 2019-06-19

## 2019-06-17 RX ORDER — PRAMOXINE HYDROCHLORIDE 150 MG/15G
1 AEROSOL, FOAM RECTAL EVERY 4 HOURS
Refills: 0 | Status: DISCONTINUED | OUTPATIENT
Start: 2019-06-17 | End: 2019-06-19

## 2019-06-17 RX ORDER — BENZOCAINE 10 %
1 GEL (GRAM) MUCOUS MEMBRANE EVERY 6 HOURS
Refills: 0 | Status: DISCONTINUED | OUTPATIENT
Start: 2019-06-17 | End: 2019-06-19

## 2019-06-17 RX ORDER — DIBUCAINE 1 %
1 OINTMENT (GRAM) RECTAL EVERY 6 HOURS
Refills: 0 | Status: DISCONTINUED | OUTPATIENT
Start: 2019-06-17 | End: 2019-06-19

## 2019-06-17 RX ORDER — HYDROCORTISONE 1 %
1 OINTMENT (GRAM) TOPICAL EVERY 6 HOURS
Refills: 0 | Status: DISCONTINUED | OUTPATIENT
Start: 2019-06-17 | End: 2019-06-19

## 2019-06-17 RX ORDER — LANOLIN
1 OINTMENT (GRAM) TOPICAL EVERY 6 HOURS
Refills: 0 | Status: DISCONTINUED | OUTPATIENT
Start: 2019-06-17 | End: 2019-06-19

## 2019-06-17 RX ORDER — IBUPROFEN 200 MG
600 TABLET ORAL EVERY 6 HOURS
Refills: 0 | Status: COMPLETED | OUTPATIENT
Start: 2019-06-17 | End: 2020-05-15

## 2019-06-17 RX ORDER — AER TRAVELER 0.5 G/1
1 SOLUTION RECTAL; TOPICAL EVERY 4 HOURS
Refills: 0 | Status: DISCONTINUED | OUTPATIENT
Start: 2019-06-17 | End: 2019-06-19

## 2019-06-17 RX ORDER — KETOROLAC TROMETHAMINE 30 MG/ML
30 SYRINGE (ML) INJECTION ONCE
Refills: 0 | Status: DISCONTINUED | OUTPATIENT
Start: 2019-06-17 | End: 2019-06-17

## 2019-06-17 RX ORDER — IBUPROFEN 200 MG
600 TABLET ORAL EVERY 6 HOURS
Refills: 0 | Status: DISCONTINUED | OUTPATIENT
Start: 2019-06-17 | End: 2019-06-19

## 2019-06-17 RX ORDER — OXYCODONE HYDROCHLORIDE 5 MG/1
5 TABLET ORAL ONCE
Refills: 0 | Status: DISCONTINUED | OUTPATIENT
Start: 2019-06-17 | End: 2019-06-19

## 2019-06-17 RX ORDER — DIPHENHYDRAMINE HCL 50 MG
25 CAPSULE ORAL EVERY 6 HOURS
Refills: 0 | Status: DISCONTINUED | OUTPATIENT
Start: 2019-06-17 | End: 2019-06-19

## 2019-06-17 RX ORDER — MAGNESIUM HYDROXIDE 400 MG/1
30 TABLET, CHEWABLE ORAL
Refills: 0 | Status: DISCONTINUED | OUTPATIENT
Start: 2019-06-17 | End: 2019-06-19

## 2019-06-17 RX ORDER — SODIUM CHLORIDE 9 MG/ML
3 INJECTION INTRAMUSCULAR; INTRAVENOUS; SUBCUTANEOUS EVERY 8 HOURS
Refills: 0 | Status: DISCONTINUED | OUTPATIENT
Start: 2019-06-17 | End: 2019-06-18

## 2019-06-17 RX ORDER — TETANUS TOXOID, REDUCED DIPHTHERIA TOXOID AND ACELLULAR PERTUSSIS VACCINE, ADSORBED 5; 2.5; 8; 8; 2.5 [IU]/.5ML; [IU]/.5ML; UG/.5ML; UG/.5ML; UG/.5ML
0.5 SUSPENSION INTRAMUSCULAR ONCE
Refills: 0 | Status: DISCONTINUED | OUTPATIENT
Start: 2019-06-17 | End: 2019-06-19

## 2019-06-17 RX ORDER — GLYCERIN ADULT
1 SUPPOSITORY, RECTAL RECTAL AT BEDTIME
Refills: 0 | Status: DISCONTINUED | OUTPATIENT
Start: 2019-06-17 | End: 2019-06-19

## 2019-06-17 RX ADMIN — Medication 15 MILLILITER(S): at 03:21

## 2019-06-17 RX ADMIN — Medication 600 MILLIGRAM(S): at 23:06

## 2019-06-17 RX ADMIN — Medication 30 MILLIGRAM(S): at 16:44

## 2019-06-17 RX ADMIN — Medication 600 MILLIGRAM(S): at 23:45

## 2019-06-17 RX ADMIN — SODIUM CHLORIDE 125 MILLILITER(S): 9 INJECTION, SOLUTION INTRAVENOUS at 03:00

## 2019-06-17 RX ADMIN — SODIUM CHLORIDE 125 MILLILITER(S): 9 INJECTION, SOLUTION INTRAVENOUS at 03:09

## 2019-06-17 RX ADMIN — Medication 2 MILLIUNIT(S)/MIN: at 06:15

## 2019-06-17 RX ADMIN — Medication 975 MILLIGRAM(S): at 20:09

## 2019-06-17 RX ADMIN — Medication 975 MILLIGRAM(S): at 20:40

## 2019-06-18 LAB
HCT VFR BLD CALC: 34.9 % — SIGNIFICANT CHANGE UP (ref 34.5–45)
HGB BLD-MCNC: 11.6 G/DL — SIGNIFICANT CHANGE UP (ref 11.5–15.5)

## 2019-06-18 RX ADMIN — Medication 975 MILLIGRAM(S): at 20:28

## 2019-06-18 RX ADMIN — Medication 600 MILLIGRAM(S): at 05:38

## 2019-06-18 RX ADMIN — Medication 975 MILLIGRAM(S): at 09:45

## 2019-06-18 RX ADMIN — Medication 975 MILLIGRAM(S): at 08:56

## 2019-06-18 RX ADMIN — Medication 975 MILLIGRAM(S): at 02:35

## 2019-06-18 RX ADMIN — Medication 975 MILLIGRAM(S): at 02:04

## 2019-06-18 RX ADMIN — Medication 975 MILLIGRAM(S): at 14:52

## 2019-06-18 RX ADMIN — Medication 600 MILLIGRAM(S): at 06:10

## 2019-06-18 RX ADMIN — Medication 975 MILLIGRAM(S): at 15:40

## 2019-06-18 RX ADMIN — Medication 600 MILLIGRAM(S): at 17:45

## 2019-06-18 RX ADMIN — Medication 600 MILLIGRAM(S): at 23:04

## 2019-06-18 RX ADMIN — Medication 600 MILLIGRAM(S): at 11:15

## 2019-06-18 RX ADMIN — Medication 600 MILLIGRAM(S): at 12:00

## 2019-06-18 RX ADMIN — Medication 600 MILLIGRAM(S): at 23:35

## 2019-06-18 RX ADMIN — Medication 600 MILLIGRAM(S): at 17:00

## 2019-06-18 RX ADMIN — Medication 975 MILLIGRAM(S): at 21:00

## 2019-06-18 RX ADMIN — Medication 1 TABLET(S): at 08:55

## 2019-06-18 NOTE — PROGRESS NOTE ADULT - PROBLEM SELECTOR PLAN 1
- Pain well controlled, continue current pain regimen  - Increase ambulation, SCDs when not ambulating  - Continue regular diet    Brenda Nguyen D.O. PGY2

## 2019-06-18 NOTE — PROGRESS NOTE ADULT - SUBJECTIVE AND OBJECTIVE BOX
OB Progress Note:  PPD#1    S: 32yo PPD#1 s/p . Patient feels well. Pain is well controlled. She is tolerating a regular diet and passing flatus. She is voiding spontaneously, and ambulating without difficulty. Denies CP/SOB. Denies lightheadedness/dizziness. Denies N/V.    O:  Vitals:  Vital Signs Last 24 Hrs  T(C): 36.8 (2019 06:45), Max: 37.3 (2019 16:35)  T(F): 98.2 (2019 06:45), Max: 99.1 (2019 16:35)  HR: 87 (2019 06:45) (84 - 106)  BP: 111/75 (2019 06:45) (105/81 - 119/78)  BP(mean): 77 (2019 17:05) (77 - 84)  RR: 18 (2019 06:45) (18 - 19)  SpO2: 97% (2019 06:45) (95% - 99%)    MEDICATIONS  (STANDING):  acetaminophen   Tablet .. 975 milliGRAM(s) Oral every 6 hours  dextrose 5% + lactated ringers. 1000 milliLiter(s) (125 mL/Hr) IV Continuous <Continuous>  diphtheria/tetanus/pertussis (acellular) Vaccine (ADAcel) 0.5 milliLiter(s) IntraMuscular once  ibuprofen  Tablet. 600 milliGRAM(s) Oral every 6 hours  oxytocin Infusion 333.333 milliUNIT(s)/Min (1000 mL/Hr) IV Continuous <Continuous>  oxytocin Infusion 2 milliUNIT(s)/Min (2 mL/Hr) IV Continuous <Continuous>  prenatal multivitamin 1 Tablet(s) Oral daily  sodium chloride 0.9% lock flush 3 milliLiter(s) IV Push every 8 hours      Labs:  Blood type: A Positive  Rubella IgG: RPR: Negative                          12.7   13.3<H> >-----------< 195    (  @ 03:20 )             35.6                  Physical Exam:  General: NAD  Abdomen: soft, non-tender, non-distended, fundus firm  Vaginal: Lochia wnl  Extremities: NTBL

## 2019-06-19 ENCOUNTER — TRANSCRIPTION ENCOUNTER (OUTPATIENT)
Age: 34
End: 2019-06-19

## 2019-06-19 VITALS
TEMPERATURE: 98 F | HEART RATE: 80 BPM | RESPIRATION RATE: 18 BRPM | SYSTOLIC BLOOD PRESSURE: 106 MMHG | DIASTOLIC BLOOD PRESSURE: 73 MMHG

## 2019-06-19 PROCEDURE — 86900 BLOOD TYPING SEROLOGIC ABO: CPT

## 2019-06-19 PROCEDURE — 86850 RBC ANTIBODY SCREEN: CPT

## 2019-06-19 PROCEDURE — G0463: CPT

## 2019-06-19 PROCEDURE — 85018 HEMOGLOBIN: CPT

## 2019-06-19 PROCEDURE — 86780 TREPONEMA PALLIDUM: CPT

## 2019-06-19 PROCEDURE — 59050 FETAL MONITOR W/REPORT: CPT

## 2019-06-19 PROCEDURE — 85027 COMPLETE CBC AUTOMATED: CPT

## 2019-06-19 PROCEDURE — 59025 FETAL NON-STRESS TEST: CPT

## 2019-06-19 PROCEDURE — 85014 HEMATOCRIT: CPT

## 2019-06-19 PROCEDURE — 86901 BLOOD TYPING SEROLOGIC RH(D): CPT

## 2019-06-19 RX ORDER — ACETAMINOPHEN 500 MG
3 TABLET ORAL
Qty: 0 | Refills: 0 | DISCHARGE
Start: 2019-06-19

## 2019-06-19 RX ORDER — ASCORBIC ACID 60 MG
1 TABLET,CHEWABLE ORAL
Qty: 0 | Refills: 0 | DISCHARGE

## 2019-06-19 RX ORDER — IBUPROFEN 200 MG
1 TABLET ORAL
Qty: 0 | Refills: 0 | DISCHARGE
Start: 2019-06-19

## 2019-06-19 RX ORDER — PREGABALIN 225 MG/1
1 CAPSULE ORAL
Qty: 0 | Refills: 0 | DISCHARGE

## 2019-06-19 RX ADMIN — Medication 600 MILLIGRAM(S): at 08:16

## 2019-06-19 RX ADMIN — Medication 975 MILLIGRAM(S): at 02:35

## 2019-06-19 RX ADMIN — Medication 1 TABLET(S): at 11:21

## 2019-06-19 RX ADMIN — Medication 600 MILLIGRAM(S): at 11:20

## 2019-06-19 RX ADMIN — Medication 600 MILLIGRAM(S): at 06:50

## 2019-06-19 RX ADMIN — Medication 975 MILLIGRAM(S): at 10:30

## 2019-06-19 RX ADMIN — Medication 600 MILLIGRAM(S): at 12:41

## 2019-06-19 RX ADMIN — Medication 975 MILLIGRAM(S): at 09:09

## 2019-06-19 RX ADMIN — Medication 975 MILLIGRAM(S): at 02:05

## 2019-06-19 NOTE — DISCHARGE NOTE OB - MATERIALS PROVIDED
Back To Sleep Handout/Breastfeeding Mother’s Support Group Information/[facility]  Screening Program/[facility] Hearing Screen Program/Shaken Baby Prevention Handout/Birth Certificate Instructions

## 2019-06-19 NOTE — DISCHARGE NOTE OB - PATIENT PORTAL LINK FT
You can access the [facility] Patient Portal, offered by [facility], by registering with the following website: [email or web address]

## 2019-06-19 NOTE — DISCHARGE NOTE OB - CARE PROVIDER_API CALL
Destin Daniel)  Obstetrics and Gynecology  [address] [facility], [place] [address]  Phone: [phone]  Fax: [phone]  Follow Up Time:

## 2020-06-28 ENCOUNTER — TRANSCRIPTION ENCOUNTER (OUTPATIENT)
Age: 35
End: 2020-06-28

## 2020-06-29 ENCOUNTER — TRANSCRIPTION ENCOUNTER (OUTPATIENT)
Age: 35
End: 2020-06-29

## 2020-06-29 ENCOUNTER — INPATIENT (INPATIENT)
Facility: HOSPITAL | Age: 35
LOS: 0 days | Discharge: HOME | DRG: 779 | End: 2020-06-30
Attending: OBSTETRICS & GYNECOLOGY | Admitting: OBSTETRICS & GYNECOLOGY
Payer: COMMERCIAL

## 2020-06-29 VITALS
WEIGHT: 139.99 LBS | RESPIRATION RATE: 18 BRPM | DIASTOLIC BLOOD PRESSURE: 65 MMHG | OXYGEN SATURATION: 97 % | TEMPERATURE: 98 F | SYSTOLIC BLOOD PRESSURE: 110 MMHG | HEART RATE: 81 BPM | HEIGHT: 67 IN

## 2020-06-29 DIAGNOSIS — Z90.49 ACQUIRED ABSENCE OF OTHER SPECIFIED PARTS OF DIGESTIVE TRACT: Chronic | ICD-10-CM

## 2020-06-29 DIAGNOSIS — O03.4 INCOMPLETE SPONTANEOUS ABORTION WITHOUT COMPLICATION: ICD-10-CM

## 2020-06-29 LAB
ALBUMIN SERPL ELPH-MCNC: 4 G/DL — SIGNIFICANT CHANGE UP (ref 3.3–5)
ALP SERPL-CCNC: 66 U/L — SIGNIFICANT CHANGE UP (ref 40–120)
ALT FLD-CCNC: 13 U/L — SIGNIFICANT CHANGE UP (ref 10–45)
ANION GAP SERPL CALC-SCNC: 14 MMOL/L — SIGNIFICANT CHANGE UP (ref 5–17)
ANION GAP SERPL CALC-SCNC: 8 MMOL/L — SIGNIFICANT CHANGE UP (ref 5–17)
ANION GAP SERPL CALC-SCNC: 8 MMOL/L — SIGNIFICANT CHANGE UP (ref 5–17)
APTT BLD: 27.1 SEC — LOW (ref 27.5–35.5)
APTT BLD: 27.6 SEC — SIGNIFICANT CHANGE UP (ref 27.5–36.3)
APTT BLD: 46.6 SEC — HIGH (ref 27.5–35.5)
AST SERPL-CCNC: 23 U/L — SIGNIFICANT CHANGE UP (ref 10–40)
BASOPHILS # BLD AUTO: 0.04 K/UL — SIGNIFICANT CHANGE UP (ref 0–0.2)
BASOPHILS NFR BLD AUTO: 0.4 % — SIGNIFICANT CHANGE UP (ref 0–2)
BILIRUB SERPL-MCNC: 0.3 MG/DL — SIGNIFICANT CHANGE UP (ref 0.2–1.2)
BLD GP AB SCN SERPL QL: NEGATIVE — SIGNIFICANT CHANGE UP
BUN SERPL-MCNC: 10 MG/DL — SIGNIFICANT CHANGE UP (ref 7–23)
BUN SERPL-MCNC: 10 MG/DL — SIGNIFICANT CHANGE UP (ref 7–23)
BUN SERPL-MCNC: 8 MG/DL — SIGNIFICANT CHANGE UP (ref 7–23)
CALCIUM SERPL-MCNC: 7.1 MG/DL — LOW (ref 8.4–10.5)
CALCIUM SERPL-MCNC: 7.5 MG/DL — LOW (ref 8.4–10.5)
CALCIUM SERPL-MCNC: 8.9 MG/DL — SIGNIFICANT CHANGE UP (ref 8.4–10.5)
CHLORIDE SERPL-SCNC: 106 MMOL/L — SIGNIFICANT CHANGE UP (ref 96–108)
CHLORIDE SERPL-SCNC: 109 MMOL/L — HIGH (ref 96–108)
CHLORIDE SERPL-SCNC: 110 MMOL/L — HIGH (ref 96–108)
CO2 SERPL-SCNC: 15 MMOL/L — LOW (ref 22–31)
CO2 SERPL-SCNC: 17 MMOL/L — LOW (ref 22–31)
CO2 SERPL-SCNC: 18 MMOL/L — LOW (ref 22–31)
CREAT SERPL-MCNC: 0.51 MG/DL — SIGNIFICANT CHANGE UP (ref 0.5–1.3)
CREAT SERPL-MCNC: 0.57 MG/DL — SIGNIFICANT CHANGE UP (ref 0.5–1.3)
CREAT SERPL-MCNC: 0.64 MG/DL — SIGNIFICANT CHANGE UP (ref 0.5–1.3)
EOSINOPHIL # BLD AUTO: 0.13 K/UL — SIGNIFICANT CHANGE UP (ref 0–0.5)
EOSINOPHIL NFR BLD AUTO: 1.3 % — SIGNIFICANT CHANGE UP (ref 0–6)
FIBRINOGEN PPP-MCNC: 166 MG/DL — LOW (ref 350–510)
FIBRINOGEN PPP-MCNC: 420 MG/DL — SIGNIFICANT CHANGE UP (ref 350–510)
GLUCOSE SERPL-MCNC: 101 MG/DL — HIGH (ref 70–99)
GLUCOSE SERPL-MCNC: 104 MG/DL — HIGH (ref 70–99)
GLUCOSE SERPL-MCNC: 143 MG/DL — HIGH (ref 70–99)
HCG SERPL-ACNC: 1567 MIU/ML — HIGH
HCT VFR BLD CALC: 18.9 % — CRITICAL LOW (ref 34.5–45)
HCT VFR BLD CALC: 34.6 % — SIGNIFICANT CHANGE UP (ref 34.5–45)
HCT VFR BLD CALC: 40.5 % — SIGNIFICANT CHANGE UP (ref 34.5–45)
HGB BLD-MCNC: 12 G/DL — SIGNIFICANT CHANGE UP (ref 11.5–15.5)
HGB BLD-MCNC: 13.5 G/DL — SIGNIFICANT CHANGE UP (ref 11.5–15.5)
HGB BLD-MCNC: 6.3 G/DL — CRITICAL LOW (ref 11.5–15.5)
IMM GRANULOCYTES NFR BLD AUTO: 0.3 % — SIGNIFICANT CHANGE UP (ref 0–1.5)
INR BLD: 1.04 RATIO — SIGNIFICANT CHANGE UP (ref 0.88–1.16)
INR BLD: 1.19 RATIO — HIGH (ref 0.88–1.16)
INR BLD: 1.76 RATIO — HIGH (ref 0.88–1.16)
LIDOCAIN IGE QN: 26 U/L — SIGNIFICANT CHANGE UP (ref 7–60)
LYMPHOCYTES # BLD AUTO: 3.49 K/UL — HIGH (ref 1–3.3)
LYMPHOCYTES # BLD AUTO: 34.3 % — SIGNIFICANT CHANGE UP (ref 13–44)
MCHC RBC-ENTMCNC: 29.9 PG — SIGNIFICANT CHANGE UP (ref 27–34)
MCHC RBC-ENTMCNC: 30.7 PG — SIGNIFICANT CHANGE UP (ref 27–34)
MCHC RBC-ENTMCNC: 30.9 PG — SIGNIFICANT CHANGE UP (ref 27–34)
MCHC RBC-ENTMCNC: 33.3 GM/DL — SIGNIFICANT CHANGE UP (ref 32–36)
MCHC RBC-ENTMCNC: 33.3 GM/DL — SIGNIFICANT CHANGE UP (ref 32–36)
MCHC RBC-ENTMCNC: 34.7 GM/DL — SIGNIFICANT CHANGE UP (ref 32–36)
MCV RBC AUTO: 88.5 FL — SIGNIFICANT CHANGE UP (ref 80–100)
MCV RBC AUTO: 89.8 FL — SIGNIFICANT CHANGE UP (ref 80–100)
MCV RBC AUTO: 92.6 FL — SIGNIFICANT CHANGE UP (ref 80–100)
MONOCYTES # BLD AUTO: 0.83 K/UL — SIGNIFICANT CHANGE UP (ref 0–0.9)
MONOCYTES NFR BLD AUTO: 8.2 % — SIGNIFICANT CHANGE UP (ref 2–14)
NEUTROPHILS # BLD AUTO: 5.66 K/UL — SIGNIFICANT CHANGE UP (ref 1.8–7.4)
NEUTROPHILS NFR BLD AUTO: 55.5 % — SIGNIFICANT CHANGE UP (ref 43–77)
NRBC # BLD: 0 /100 WBCS — SIGNIFICANT CHANGE UP (ref 0–0)
PLATELET # BLD AUTO: 102 K/UL — LOW (ref 150–400)
PLATELET # BLD AUTO: 216 K/UL — SIGNIFICANT CHANGE UP (ref 150–400)
PLATELET # BLD AUTO: 423 K/UL — HIGH (ref 150–400)
POTASSIUM SERPL-MCNC: 3.6 MMOL/L — SIGNIFICANT CHANGE UP (ref 3.5–5.3)
POTASSIUM SERPL-MCNC: 3.7 MMOL/L — SIGNIFICANT CHANGE UP (ref 3.5–5.3)
POTASSIUM SERPL-MCNC: 6 MMOL/L — HIGH (ref 3.5–5.3)
POTASSIUM SERPL-SCNC: 3.6 MMOL/L — SIGNIFICANT CHANGE UP (ref 3.5–5.3)
POTASSIUM SERPL-SCNC: 3.7 MMOL/L — SIGNIFICANT CHANGE UP (ref 3.5–5.3)
POTASSIUM SERPL-SCNC: 6 MMOL/L — HIGH (ref 3.5–5.3)
PROT SERPL-MCNC: 6.9 G/DL — SIGNIFICANT CHANGE UP (ref 6–8.3)
PROTHROM AB SERPL-ACNC: 12 SEC — SIGNIFICANT CHANGE UP (ref 10–12.9)
PROTHROM AB SERPL-ACNC: 13.5 SEC — SIGNIFICANT CHANGE UP (ref 10.6–13.6)
PROTHROM AB SERPL-ACNC: 19.7 SEC — HIGH (ref 10.6–13.6)
RBC # BLD: 2.04 M/UL — LOW (ref 3.8–5.2)
RBC # BLD: 3.91 M/UL — SIGNIFICANT CHANGE UP (ref 3.8–5.2)
RBC # BLD: 4.51 M/UL — SIGNIFICANT CHANGE UP (ref 3.8–5.2)
RBC # FLD: 13 % — SIGNIFICANT CHANGE UP (ref 10.3–14.5)
RBC # FLD: 13.6 % — SIGNIFICANT CHANGE UP (ref 10.3–14.5)
RBC # FLD: 13.7 % — SIGNIFICANT CHANGE UP (ref 10.3–14.5)
RH IG SCN BLD-IMP: POSITIVE — SIGNIFICANT CHANGE UP
SARS-COV-2 RNA SPEC QL NAA+PROBE: SIGNIFICANT CHANGE UP
SODIUM SERPL-SCNC: 132 MMOL/L — LOW (ref 135–145)
SODIUM SERPL-SCNC: 136 MMOL/L — SIGNIFICANT CHANGE UP (ref 135–145)
SODIUM SERPL-SCNC: 137 MMOL/L — SIGNIFICANT CHANGE UP (ref 135–145)
WBC # BLD: 10.18 K/UL — SIGNIFICANT CHANGE UP (ref 3.8–10.5)
WBC # BLD: 15.57 K/UL — HIGH (ref 3.8–10.5)
WBC # BLD: 9.29 K/UL — SIGNIFICANT CHANGE UP (ref 3.8–10.5)
WBC # FLD AUTO: 10.18 K/UL — SIGNIFICANT CHANGE UP (ref 3.8–10.5)
WBC # FLD AUTO: 15.57 K/UL — HIGH (ref 3.8–10.5)
WBC # FLD AUTO: 9.29 K/UL — SIGNIFICANT CHANGE UP (ref 3.8–10.5)

## 2020-06-29 PROCEDURE — 99285 EMERGENCY DEPT VISIT HI MDM: CPT

## 2020-06-29 PROCEDURE — 88305 TISSUE EXAM BY PATHOLOGIST: CPT | Mod: 26

## 2020-06-29 PROCEDURE — 88304 TISSUE EXAM BY PATHOLOGIST: CPT | Mod: 26

## 2020-06-29 RX ORDER — TRANEXAMIC ACID 100 MG/ML
1000 INJECTION, SOLUTION INTRAVENOUS ONCE
Refills: 0 | Status: DISCONTINUED | OUTPATIENT
Start: 2020-06-29 | End: 2020-06-29

## 2020-06-29 RX ORDER — MORPHINE SULFATE 50 MG/1
4 CAPSULE, EXTENDED RELEASE ORAL
Refills: 0 | Status: DISCONTINUED | OUTPATIENT
Start: 2020-06-29 | End: 2020-06-29

## 2020-06-29 RX ORDER — DIPHENOXYLATE HCL/ATROPINE 2.5-.025MG
3 TABLET ORAL ONCE
Refills: 0 | Status: DISCONTINUED | OUTPATIENT
Start: 2020-06-29 | End: 2020-06-29

## 2020-06-29 RX ORDER — ACETAMINOPHEN 500 MG
975 TABLET ORAL EVERY 6 HOURS
Refills: 0 | Status: DISCONTINUED | OUTPATIENT
Start: 2020-06-29 | End: 2020-06-30

## 2020-06-29 RX ORDER — IBUPROFEN 200 MG
1 TABLET ORAL
Qty: 0 | Refills: 0 | DISCHARGE
Start: 2020-06-29

## 2020-06-29 RX ORDER — IBUPROFEN 200 MG
600 TABLET ORAL EVERY 6 HOURS
Refills: 0 | Status: DISCONTINUED | OUTPATIENT
Start: 2020-06-29 | End: 2020-06-30

## 2020-06-29 RX ORDER — METOCLOPRAMIDE HCL 10 MG
10 TABLET ORAL ONCE
Refills: 0 | Status: DISCONTINUED | OUTPATIENT
Start: 2020-06-29 | End: 2020-06-29

## 2020-06-29 RX ORDER — SODIUM CHLORIDE 9 MG/ML
1000 INJECTION, SOLUTION INTRAVENOUS
Refills: 0 | Status: DISCONTINUED | OUTPATIENT
Start: 2020-06-29 | End: 2020-06-29

## 2020-06-29 RX ORDER — ONDANSETRON 8 MG/1
4 TABLET, FILM COATED ORAL ONCE
Refills: 0 | Status: DISCONTINUED | OUTPATIENT
Start: 2020-06-29 | End: 2020-06-29

## 2020-06-29 RX ORDER — SODIUM CHLORIDE 9 MG/ML
1000 INJECTION INTRAMUSCULAR; INTRAVENOUS; SUBCUTANEOUS ONCE
Refills: 0 | Status: COMPLETED | OUTPATIENT
Start: 2020-06-29 | End: 2020-06-29

## 2020-06-29 RX ORDER — CARBOPROST TROMETHAMINE 250 UG/ML
250 INJECTION, SOLUTION INTRAMUSCULAR ONCE
Refills: 0 | Status: COMPLETED | OUTPATIENT
Start: 2020-06-29 | End: 2020-06-29

## 2020-06-29 RX ORDER — CARBOPROST TROMETHAMINE 250 UG/ML
250 INJECTION, SOLUTION INTRAMUSCULAR ONCE
Refills: 0 | Status: DISCONTINUED | OUTPATIENT
Start: 2020-06-29 | End: 2020-06-29

## 2020-06-29 RX ORDER — SODIUM CHLORIDE 9 MG/ML
3 INJECTION INTRAMUSCULAR; INTRAVENOUS; SUBCUTANEOUS EVERY 8 HOURS
Refills: 0 | Status: DISCONTINUED | OUTPATIENT
Start: 2020-06-29 | End: 2020-06-30

## 2020-06-29 RX ORDER — FENTANYL CITRATE 50 UG/ML
25 INJECTION INTRAVENOUS
Refills: 0 | Status: DISCONTINUED | OUTPATIENT
Start: 2020-06-29 | End: 2020-06-29

## 2020-06-29 RX ORDER — ACETAMINOPHEN 500 MG
3 TABLET ORAL
Qty: 0 | Refills: 0 | DISCHARGE
Start: 2020-06-29

## 2020-06-29 RX ORDER — SODIUM CHLORIDE 9 MG/ML
2000 INJECTION INTRAMUSCULAR; INTRAVENOUS; SUBCUTANEOUS ONCE
Refills: 0 | Status: COMPLETED | OUTPATIENT
Start: 2020-06-29 | End: 2020-06-29

## 2020-06-29 RX ORDER — ACETAMINOPHEN 500 MG
975 TABLET ORAL EVERY 6 HOURS
Refills: 0 | Status: DISCONTINUED | OUTPATIENT
Start: 2020-06-29 | End: 2020-06-29

## 2020-06-29 RX ADMIN — Medication 10 MILLIGRAM(S): at 09:48

## 2020-06-29 RX ADMIN — ONDANSETRON 4 MILLIGRAM(S): 8 TABLET, FILM COATED ORAL at 09:48

## 2020-06-29 RX ADMIN — CARBOPROST TROMETHAMINE 250 MICROGRAM(S): 250 INJECTION, SOLUTION INTRAMUSCULAR at 09:05

## 2020-06-29 RX ADMIN — TRANEXAMIC ACID 220 MILLIGRAM(S): 100 INJECTION, SOLUTION INTRAVENOUS at 09:15

## 2020-06-29 RX ADMIN — SODIUM CHLORIDE 1000 MILLILITER(S): 9 INJECTION INTRAMUSCULAR; INTRAVENOUS; SUBCUTANEOUS at 08:00

## 2020-06-29 RX ADMIN — Medication 3 TABLET(S): at 12:18

## 2020-06-29 RX ADMIN — SODIUM CHLORIDE 2000 MILLILITER(S): 9 INJECTION INTRAMUSCULAR; INTRAVENOUS; SUBCUTANEOUS at 08:00

## 2020-06-29 RX ADMIN — SODIUM CHLORIDE 75 MILLILITER(S): 9 INJECTION, SOLUTION INTRAVENOUS at 17:00

## 2020-06-29 RX ADMIN — Medication 0.2 MILLIGRAM(S): at 09:05

## 2020-06-29 RX ADMIN — Medication 975 MILLIGRAM(S): at 19:46

## 2020-06-29 RX ADMIN — ONDANSETRON 4 MILLIGRAM(S): 8 TABLET, FILM COATED ORAL at 09:32

## 2020-06-29 RX ADMIN — Medication 0.2 MILLIGRAM(S): at 09:24

## 2020-06-29 RX ADMIN — SODIUM CHLORIDE 3 MILLILITER(S): 9 INJECTION INTRAMUSCULAR; INTRAVENOUS; SUBCUTANEOUS at 21:17

## 2020-06-29 NOTE — ED ADULT NURSE NOTE - CAS EDN DISCHARGE ASSESSMENT
Alert and oriented to person, place and time No adverse reactions to blood- blood pressure stable on transport. Reports to relief in nausea following Zofran and Reglan administration. Transported to OR with OB./Alert and oriented to person, place and time

## 2020-06-29 NOTE — CHART NOTE - NSCHARTNOTEFT_GEN_A_CORE
GYN ATTENDING    P2 at 15w5d with spontaneous miscarriage complicated by hemorrhage s/p suction D&C, EBL 1200 total, s/p 3u packed RBC, methergine x2, hemabate x2, cytotec [place], 4L NS.   Uncomplicated procedure. Patient in PACU now feeling well. Denies CP, SOB, dizziness, lightheadedness. Sitting in chair. Had clear diet tolerated well, voided 400cc, ambulated with assistance.   minimal bleeding.     Vital Signs Last 24 Hrs  T(C): 36.9 (29 Jun 2020 17:00), Max: 37.1 (29 Jun 2020 08:23)  T(F): 98.4 (29 Jun 2020 17:00), Max: 98.7 (29 Jun 2020 08:23)  HR: 91 (29 Jun 2020 17:15) (79 - 120)  BP: 124/91 (29 Jun 2020 17:15) (70/38 - 134/99)  BP(mean): 102 (29 Jun 2020 17:15) (67 - 102)  RR: 16 (29 Jun 2020 17:15) (14 - 26)  SpO2: 98% (29 Jun 2020 17:15) (96% - 100%)                            12.0   15.57 )-----------( 216      ( 29 Jun 2020 14:50 )             34.6   baso x      eos x      imm gran x      lymph x      mono x      poly x                            6.3    9.29  )-----------( 102      ( 29 Jun 2020 11:34 )             18.9   baso x      eos x      imm gran x      lymph x      mono x      poly x                            13.5   10.18 )-----------( 423      ( 29 Jun 2020 08:24 )             40.5   baso 0.4    eos 1.3    imm gran 0.3    lymph 34.3   mono 8.2    poly 55.5       06-29 @ 14:50    136  |  110  |  8   ----------------------------<  101  3.7   |  18  |  0.51      PT/INR - ( 29 Jun 2020 14:50 )   PT: 13.5 sec;   INR: 1.19 ratio    PTT - ( 29 Jun 2020 14:50 )  PTT:27.1 sec    Fibrinogen Assay (06.29.20 @ 14:50)    Fibrinogen Assay: 420 mg/dL    AP:  at 15w5d with spontaneous miscarriage complicated by hemorrhage s/p POD#0 from emergent suction D&C, EBL 1200 total, s/p 3u packed RBC, methergine x2, hemabate x2, cytotec [place], 4L NS.   patient doing well, clinically stable and asymptomatic at this time. Labs improved without anemia or hypofibrinogenemia. VS stable.   Reviewed with patient, labs are not likely equilibrated at this point for acute blood loss and resuscitation.   Discussed with patient, will monitor her overnight for any acute change. Will repeat labs in AM to trend CBC, CMP, coags.   If all remains stable, likely d/c home tomorrow. Reg diet. routine postop orders. saline lock if feeling well.   Patient agrees with plan, all questions and concerns addressed. GYN team updated.   Bessy BUSCH

## 2020-06-29 NOTE — DISCHARGE NOTE PROVIDER - PROVIDER TOKENS
PROVIDER:[TOKEN:[[id number]]] PROVIDER:[TOKEN:[[id number]],FOLLOWUP:[2 weeks],ESTABLISHEDPATIENT:[T]]

## 2020-06-29 NOTE — DISCHARGE NOTE PROVIDER - NSDCMRMEDTOKEN_GEN_ALL_CORE_FT
acetaminophen 325 mg oral tablet: 3 tab(s) orally every 6 hours, As needed, Moderate Pain (4 - 6)  ibuprofen 600 mg oral tablet: 1 tab(s) orally every 6 hours, As needed, Moderate Pain (4 - 6)

## 2020-06-29 NOTE — BRIEF OPERATIVE NOTE - OPERATION/FINDINGS
Anteverted 12 week size uterus  cervix dilated to 2-3 cm  uterine contents primarily in POLA/cervix removed with suction

## 2020-06-29 NOTE — CHART NOTE - NSCHARTNOTEFT_GEN_A_CORE
Postoperative #0    P2 now POD#0 s/p emergent suction D&C for hemorrhage after miscarriage at 15wks. Total EBL in ED/OR 1200ml, unknown amount of bleeding at home.   Patient received 3u RBC, 4 liters NS fluid, methergine 0.2mg x 2, hemabate 250mcg x 2, cytotec 1000mg [place]. OR case uncomplicated, 50ml, sono confirmed empty uterus and thin EM stripe after.  Uterine contents sent as POC to pathology and genetics sent.     Patient in PACU now and seen at bedside. She reports feeling slightly lightheaded still, especially if sitting up. Overall feeling improved. some continued vaginal bleeding. has not been out of bed or had any food/water.   no nausea/emesis now. pain controlled. no cramping at this time.     ICU Vital Signs Last 24 Hrs  T(C): 36.6 (29 Jun 2020 11:36), Max: 37.1 (29 Jun 2020 08:23)  T(F): 97.9 (29 Jun 2020 11:36), Max: 98.7 (29 Jun 2020 08:23)  HR: 99 (29 Jun 2020 14:00) (81 - 120)  BP: 102/64 (29 Jun 2020 14:00) (70/38 - 134/99)  BP(mean): 77 (29 Jun 2020 14:00) (70 - 82)  RR: 16 (29 Jun 2020 14:00) (14 - 26)  SpO2: 100% (29 Jun 2020 14:00) (96% - 100%)    PE:   awake, alert, oriented. pinkness to face, very minimal palor  abdomen soft ND, NT, fundus firm  Pelvic: minimal blood on pad, small trickle (10cc total)                          6.3    9.29  )-----------( 102      ( 29 Jun 2020 11:34 )             18.9   baso x      eos x      imm gran x      lymph x      mono x      poly x                            13.5   10.18 )-----------( 423      ( 29 Jun 2020 08:24 )             40.5   baso 0.4    eos 1.3    imm gran 0.3    lymph 34.3   mono 8.2    poly 55.5     06-29 @ 13:00    132  |  109  |  10  ----------------------------<  104  6.0   |  15  |  0.57    06-29 @ 08:16    137  |  106  |  10  ----------------------------<  143  3.6   |  17  |  0.64      TPro  6.9  /  Alb  4.0  /  TBili  0.3  /  DBili  x   /  AST  23  /  ALT  13  /  AlkPhos  66  06-29 @ 08:16    Fibrinogen Assay (06.29.20 @ 11:34)    Fibrinogen Assay: 166:  mg/dL    Prothrombin Time and INR, Plasma in AM (06.29.20 @ 11:34)    Prothrombin Time, Plasma: 19.7 sec    INR: 1.76 ratio    Activated Partial Thromboplastin Time in AM (06.29.20 @ 11:34)    Activated Partial Thromboplastin Time: 46.6 sec    AP: P2 at 15wks with spontaneous miscarriage with hemorrhage s/p suction D&C, EBL 1200 total, s/p 3u RBC, methergine x2, hemabate x2, cytotec [place], 4L NS.   Rechecking labs now - CBC, CMP, fibrinogen, coags.   Will have patient void, clear diet, trial sitting up/walking with assistance.   if any further symptoms or blood values low - will consider 4th unit RBC, one platelet and one ffp.   continue to monitor closely. currently stable VS and much improved. minimal VB.   seen at bedside with MD Lenka Doherty MD

## 2020-06-29 NOTE — DISCHARGE NOTE PROVIDER - CARE PROVIDER_API CALL
Naomi Baeza  OBSTETRICS AND GYNECOLOGY  [address] [place] [address]  Phone: [phone]  Fax: [phone]  Follow Up Time: Naomi Baeza  OBSTETRICS AND GYNECOLOGY  [address] [place] [address]  Phone: [phone]  Fax: [phone]  Established Patient  Follow Up Time: 2 weeks

## 2020-06-29 NOTE — DISCHARGE NOTE PROVIDER - NSDCACTIVITY_GEN_ALL_CORE
Return to Work/School allowed/Stairs allowed/Showering allowed/Walking - Indoors allowed/Walking - Outdoors allowed/Driving allowed

## 2020-06-29 NOTE — ED PROVIDER NOTE - OBJECTIVE STATEMENT
35 yo f 15 w by US pw vb. pt reports this morning felt lower abd cramps approx 20 min PTA and noticed significant bleeding vaginally. states noted "fetus expelled". reports mild lightheadedness. states past pregnancies c/b PPH requiring multiple uterine contractions medications. denies n/v, cp, sob.

## 2020-06-29 NOTE — DISCHARGE NOTE PROVIDER - NSDCFUADDINST_GEN_ALL_CORE_FT
Expect abdominal cramping/pain and spotting for the next weeks. Take ibuprofen and Tylenol for cramping. Use a pad as needed. Call your physician or go to the emergency room if you experience any of the following: heavy vaginal bleeding (soaking more than 2 pads in 1 hour for 2 hours), fever, chills, nausea, vomiting, or pain that is not controlled by medication. Follow-up with Dr. Baeza in 2 weeks. Expect abdominal cramping/pain and spotting for the next couple of weeks. Take ibuprofen and Tylenol for cramping. Use a pad as needed. Call your physician or go to the emergency room if you experience any of the following: heavy vaginal bleeding (soaking more than 2 pads in 1 hour for 2 hours), fever, chills, nausea, vomiting, or pain that is not controlled by medication. Follow-up with Dr. Baeza in 2 weeks.

## 2020-06-29 NOTE — H&P ADULT - PROBLEM SELECTOR PLAN 1
- to OR for D&C  - covid swab  - NPO  - cytotec, methergine, hemabate  - PRBC stat    MARGAUX Og,PGY-2  d/w Aryan

## 2020-06-29 NOTE — ED PROVIDER NOTE - ATTENDING CONTRIBUTION TO CARE
ED attending Allie BUSH performed a history and physical exam of the patient and discussed their management with the resident/ACP. I reviewed the resident/ACP's note and agree with the documented findings and plan of care except for the following.     34 year old female EGA 15 weeks presented to ED with vaginal bleeding. Pt with active vaginal bleeding with in the ED. Borderline hypotensive and tachycardic.  Pt had similar previous episode, was given     Likely spontaneous  with copious vaginal bleeding.

## 2020-06-29 NOTE — ED ADULT NURSE NOTE - OBJECTIVE STATEMENT
34 y female presents from home 15 weeks pregnant with spontaneous vaginal delivery. Patient reports to abdominal cramping with a large amount of blood and clots pass from vagina 15 minutes prior to arrival. Patient reports passing fetus at home. On arrival- large amount of blood expelled with clots standing from wheelchair and in bed. Reports to feeling of bleeding continuously. Patient saturated 2 chucks with clotted blood. Reporting to lightheadedness, dizziness. Denies SOB, N/V/D, fevers, chills. A&Ox3. Skin warm dry and intact/ 34 y female presents from home 15 weeks pregnant with spontaneous vaginal delivery. Patient reports to abdominal cramping with a large amount of blood and clots pass from vagina 15 minutes prior to arrival. Patient reports passing fetus at home. On arrival- large amount of blood expelled with clots standing from wheelchair and in bed. Reports to feeling of bleeding continuously. Patient saturated 2 chucks with clotted blood. Reporting to lightheadedness, dizziness. Denies SOB, N/V/D, fevers, chills. A&Ox3. Skin warm dry and intact. Breathing comfortably- no distress. Abdomen soft nondistended. Safety maintained- bed locked in lowest position.

## 2020-06-29 NOTE — ED PROVIDER NOTE - NS ED ROS FT
GENERAL: No fever or chills, //             EYES: no change in vision, //             HEENT: no trouble swallowing or speaking, //             CARDIAC: no chest pain, //              PULMONARY: no cough or SOB, //             GI: no abdominal pain, no nausea or no vomiting, no diarrhea or constipation, //             : vb  //            SKIN: no rashes,  //            NEURO: no headache,  //             MSK: No joint pain otherwise as HPI or negative. ~Ford Rubi DO PGY2

## 2020-06-29 NOTE — DISCHARGE NOTE PROVIDER - HOSPITAL COURSE
33yo  at 15w5d (PHIL 2020) presenting with incomplete  and active hemorrhage. Pt received cytotec MA, Hemabate x2, Methergine IM x2, TXA 33yo  at 15w5d (PHIL 2020) presenting with incomplete  and active hemorrhage. Pt received cytotec IA, Hemabate x2, Methergine IM x2, TXA and 3uPRBC. Pt went to OR emergently for D&C (please see operative report for details). Pt recovered well post-operatively and was discharged home in stable conditions, voiding, ambulating, with pain and bleeding under control. 35yo  at 15w5d (PHIL 2020) presenting with incomplete  and active hemorrhage. Pt received cytotec NH, Hemabate x2, Methergine IM x2, TXA and 3uPRBC. Pt went to OR emergently for D&C (please see operative report for details). Pt recovered well post-operatively, was monitored overnight and was discharged home in stable conditions, voiding, ambulating, with pain and bleeding under control. H/H 10/32. 35yo  at 15w5d (PHIL 2020) presenting with incomplete  and active hemorrhage. Pt received cytotec DE, Hemabate x2, Methergine IM x2, TXA and 3uPRBC. Pt went to OR emergently for D&C (please see operative report for details). Pt recovered well post-operatively, was monitored overnight and was discharged home in stable condition, voiding, ambulating, with pain and bleeding under control. H/H on the day of discharge: 10/31. Patient will follow-up in the office with Dr. Baeza.

## 2020-06-29 NOTE — BRIEF OPERATIVE NOTE - NSICDXBRIEFPOSTOP_GEN_ALL_CORE_FT
POST-OP DIAGNOSIS:  Uterine hemorrhage 2020 18:50:38  María Elena Og  Incomplete  2020 18:50:32  María Elena Og

## 2020-06-29 NOTE — CHART NOTE - NSCHARTNOTEFT_GEN_A_CORE
Patient seen and examined at bedside, recently post-op. Pt reports continued dizziness and lightheadedness. Has need yet been OOB, ambulating, voiding, or tolerating regular diet. Denies CP, SOB, N/V, fevers, and chills.    Vital Signs Last 24 Hours  T(C): 36.6 (20 @ 11:36), Max: 37.1 (20 @ 08:23)  HR: 86 (20 @ 14:30) (81 - 120)  BP: 101/57 (20 @ 14:30) (70/38 - 134/99)  RR: 16 (20 @ 14:30) (14 - 26)  SpO2: 100% (20 @ 14:30) (96% - 100%)    I&O's Summary    2020 07:01  -  2020 14:47  --------------------------------------------------------  IN: 150 mL / OUT: 0 mL / NET: 150 mL        Physical Exam:  General: NAD  CV: regular rate  Lungs: unlabored on RA  Abdomen: Soft, nontender, nondistended  : minimal bleeding on pad  Ext: No pain or swelling    Labs:             6.3<LL>  9.29  )-----------( 102<L>    (  @ 11:34 )             18.9<LL>               13.5   10.18 )-----------( 423<H>    (  @ 08:24 )             40.5         MEDICATIONS  (STANDING):  lactated ringers. 1000 milliLiter(s) (75 mL/Hr) IV Continuous <Continuous>    MEDICATIONS  (PRN):  acetaminophen   Tablet .. 975 milliGRAM(s) Oral every 6 hours PRN Moderate Pain (4 - 6)  fentaNYL    Injectable 25 MICROGram(s) IV Push every 5 minutes PRN Moderate Pain (4 - 6)  ibuprofen  Tablet. 600 milliGRAM(s) Oral every 6 hours PRN Moderate Pain (4 - 6)  morphine  - Injectable 4 milliGRAM(s) IV Push every 10 minutes PRN Severe Pain (7 - 10)  ondansetron Injectable 4 milliGRAM(s) IV Push once PRN Nausea and/or Vomiting      33yo  s/p D&C 2/2 incomplete  in the setting of active hemorrhage.     Neuro: continue tylenol/motrin PO for pain  CV: Hemodynamically stable; f/u STAT CBC/BMP/Coags/Fibrinogen  Pulm: Ecourage oob/ambulation  GI: Advance to regular diet  : DTV@730pm  Heme: SCDs for DVT PPX  - s/p 3uPRBC prior to OR  - consider additional PRBC or FFP when stat labs return  ID: afebrile  Endo: no active issues  Dispo: continue routine post-op care    MARGAUX Og, PGY-2  patient seen and evaluated with Dr. Baeza

## 2020-06-29 NOTE — ED PROVIDER NOTE - CLINICAL SUMMARY MEDICAL DECISION MAKING FREE TEXT BOX
jonathan pgy2: 33 yo f pw likely SAB, w/ significant bleeding in ED. pt arrives mentating well, hds. bp in 90s systolic. will allow for permissive hypotension and possible uterine contraction agents prior to blood transfusion. OB at bedside. will reassess.

## 2020-06-29 NOTE — H&P ADULT - NSHPPHYSICALEXAM_GEN_ALL_CORE
Physical Exam:   General: laying in bed in Trendelenburg appears pale  CV: RR S1S2 no m/r/g  Lungs: CTA b/l, unlabored on RA  Abd: Soft, non-tender, non-distended,  :  Active bleeding with ~500cc of clot on pad. Speculum exam with many clots, unable to visualize cervix. Bimanual exam with cervix dilated to 0.5 cm  Ext: non-tender b/l, no edema

## 2020-06-29 NOTE — PATIENT PROFILE OB - ABILITY TO HEAR (WITH HEARING AID OR HEARING APPLIANCE IF NORMALLY USED):
Patient resting in stretcher and in no acute distress.  Patient reports pain is 2/10 in the abdomen and refusing pain meds at this time.  Patient has no needs at this time.  Safety ensured. Adequate: hears normal conversation without difficulty

## 2020-06-29 NOTE — H&P ADULT - HISTORY OF PRESENT ILLNESS
JESSICA JACOBS  34y  Female [id number]    HPI: 35 yo  at 15+ weeks (PHIL 2020) presenting with profuse vaginal bleeding after having miscarriage at home. Pt states she passed entire fetus however continued to bleed. She reports feeling lightheaded and dizziness with nausea. Patient denies chest pain, shortness of breath, fevers, chills, vomiting, diarrhea.       Ob/Gyn Physician: Oppenheim    Obhx:  2017 c/b hemorrhage requiring multiple uterotonics. Pt states she almost recieved transfusion.  2019, uncomplicated  GynHx: Denies fibroids, cysts, abnormal pap smears, STIs  PMH: denies  PSH: Laparoscopic appendectomy  Social: Denies Toxic Habits x3; denies anxiety/depression  Meds: none  All: Bactrim, Amoxicillin  Allergies: [facility] Meds:   MEDICATIONS  (STANDING):  methylergonovine 0.2 milliGRAM(s) Oral Once  sodium chloride 0.9% Bolus 1000 milliLiter(s) IV Bolus once  sodium chloride 0.9% Bolus 2000 milliLiter(s) IV Bolus once    MEDICATIONS  (PRN):      Allergies    amoxicillin (Unknown)  Bactrim (Unknown)    Intolerances            Vital Signs Last 24 Hrs  T(C): 37.1 (2020 08:23), Max: 37.1 (2020 08:23)  T(F): 98.7 (2020 08:23), Max: 98.7 (2020 08:23)  HR: 102 (2020 08:23) (81 - 102)  BP: 89/52 (2020 08:23) (70/38 - 110/65)  BP(mean): --  RR: 18 (2020 08:23) (18 - 22)  SpO2: 98% (2020 08:23) (96% - 98%)    Physical Exam:   General: laying in bed in Trendelenburg appears pale  CV: RR S1S2 no m/r/g  Lungs: CTA b/l, unlabored on RA  Abd: Soft, non-tender, non-distended,  :  Active bleeding with ~500cc of clot on pad. Speculum exam with many clots, unable to visualize cervix. Bimanual exam with cervix dilated to 0.5 cm  Ext: non-tender b/l, no edema     LABS:                              13.5   10.18 )-----------( 423      ( 2020 08:24 )             40.5         137  |  106  |  10  ----------------------------<  143<H>  3.6   |  17<L>  |  0.64    Ca    8.9      2020 08:16    TPro  6.9  /  Alb  4.0  /  TBili  0.3  /  DBili  x   /  AST  23  /  ALT  13  /  AlkPhos  66      I&O's Detail    PT/INR - ( 2020 08:16 )   PT: 12.0 sec;   INR: 1.04 ratio         PTT - ( 2020 08:16 )  PTT:27.6 sec      RADIOLOGY & ADDITIONAL STUDIES: JESSICA JACOBS  34y  Female [id number]    HPI: 35 yo  at 15+ weeks (PHIL 2020) presenting with profuse vaginal bleeding after having miscarriage at home. Pt states she passed entire fetus however continued to bleed. She reports feeling lightheaded and dizziness with nausea. Patient denies chest pain, shortness of breath, fevers, chills, vomiting, diarrhea.       Ob/Gyn Physician: Oppenheim    Obhx:  2017 c/b hemorrhage requiring multiple uterotonics. Pt states she almost recieved transfusion.  2019, uncomplicated  GynHx: Denies fibroids, cysts, abnormal pap smears, STIs  PMH: denies  PSH: Laparoscopic appendectomy  Social: Denies Toxic Habits x3; denies anxiety/depression  Meds: none  All: Bactrim, Amoxicillin  Allergies: [facility] Meds:   MEDICATIONS  (STANDING):  methylergonovine 0.2 milliGRAM(s) Oral Once  sodium chloride 0.9% Bolus 1000 milliLiter(s) IV Bolus once  sodium chloride 0.9% Bolus 2000 milliLiter(s) IV Bolus once    MEDICATIONS  (PRN):      Allergies    amoxicillin (Unknown)  Bactrim (Unknown)    Intolerances            Vital Signs Last 24 Hrs  T(C): 37.1 (2020 08:23), Max: 37.1 (2020 08:23)  T(F): 98.7 (2020 08:23), Max: 98.7 (2020 08:23)  HR: 102 (2020 08:23) (81 - 102)  BP: 89/52 (2020 08:23) (70/38 - 110/65)  BP(mean): --  RR: 18 (2020 08:23) (18 - 22)  SpO2: 98% (2020 08:23) (96% - 98%)

## 2020-06-29 NOTE — H&P ADULT - ATTENDING COMMENTS
35yo P2 at 15w5d by LMP c/w first trimester sono admitted with miscarriage of fetus at home and acute hemorrhage.   Patient seen in ED. Initially moderate bleeding reported by resident with 400ml on initial eval. Given cytotec rectally and methergine IM x 1 with 2 liters LR fluid. Patient became hypotensive 50/30s, dizzy, and 500ml on pad. I was at bedside at this time. Patient given 2u RBC O neg, hemabate 250mcg x 1, IV fluid NS on pressure bag. Given methergine 0.2mg IM (dose #2) and hemabate 250mcg (#2) IM and tranexemic acid 1000mg in NS 100ml and zofran 8mg and reglan 10mg for nausea. Patient awake and alert throughout. Another 300ml on pad noted. total EBL in hospital 1200ml and patient reports significant bleeding at home. Given additional 1uRBC type A pos. Consented for D&C emergent for hemorrhage after miscarriage with retained tissue, desires genetics to be sent.  at bedside. OB resident at bedside. Patient stabilized and taken to OR for emergent procedure.  Bessy BUSCH

## 2020-06-29 NOTE — BRIEF OPERATIVE NOTE - NSICDXBRIEFPREOP_GEN_ALL_CORE_FT
PRE-OP DIAGNOSIS:  Uterine hemorrhage 2020 18:50:24  María Elena Og  Incomplete  2020 18:50:16  María Elena Og

## 2020-06-29 NOTE — ED PROVIDER NOTE - PROGRESS NOTE DETAILS
Per patient and GYN, requested cytotec and methergin prior to TVUS confirmation due active bleeding and unstable vital signs bp decreasing, pt mentating well, hr low 100s. OB bedside. 2U prbc emergent consent.  - Ford Rubi D.O. PGY2 accepted to ob service, likely going to OR  - Ford Rubi D.O. PGY2 bp decreasing, pt mentating well, hr low 100s. OB bedside. 2U prbc emergent consent.  - Ford Rubi D.O. PGY3 accepted to ob service, likely going to OR  - Ford Rubi D.O. PGY3 Patient reassessed, NAD, non-toxic appearing. pt received blood. hds. heading up with OB.  - Ford Rubi D.O. PGY3

## 2020-06-29 NOTE — DISCHARGE NOTE PROVIDER - NSDCCPCAREPLAN_GEN_ALL_CORE_FT
PRINCIPAL DISCHARGE DIAGNOSIS  Diagnosis: Vaginal bleeding  Assessment and Plan of Treatment:       SECONDARY DISCHARGE DIAGNOSES  Diagnosis: Incomplete   Assessment and Plan of Treatment: PRINCIPAL DISCHARGE DIAGNOSIS  Diagnosis: Hemorrhage before 22 weeks gestation  Assessment and Plan of Treatment:       SECONDARY DISCHARGE DIAGNOSES  Diagnosis: Incomplete   Assessment and Plan of Treatment:

## 2020-06-29 NOTE — ED PROVIDER NOTE - PHYSICAL EXAMINATION
General: nad, pallor noted  HEENT: EOMI, PERRLA, normal mucosa, normal oropharynx, no lesions on the lips or on oral mucosa, normal external ear  Neck: supple, no lymphadenopathy, full range of motion, no nuchal rigidity  CV: RRR  Resp: non labored breathing  Abd: non-distended, soft, non-tender  : no CVA tenderness, blood pooling outside genitalia (chaperone:   MSK: full range of motion, no cyanosis, no edema, no clubbing, no immobility  Neuro: CN II-XII grossly intact, muscle strength 5/5 in all extremities  Skin: no rashes, skin intact General: nad, pallor noted  HEENT: EOMI, PERRLA, normal mucosa, normal oropharynx, no lesions on the lips or on oral mucosa, normal external ear  Neck: supple, no lymphadenopathy, full range of motion, no nuchal rigidity  CV: RRR  Resp: non labored breathing  Abd: non-distended, soft, non-tender  : no CVA tenderness, blood pooling outside genitalia (chaperone: ga akins rn)  MSK: full range of motion, no cyanosis, no edema, no clubbing, no immobility  Neuro: CN II-XII grossly intact, muscle strength 5/5 in all extremities  Skin: no rashes, skin intact

## 2020-06-30 ENCOUNTER — TRANSCRIPTION ENCOUNTER (OUTPATIENT)
Age: 35
End: 2020-06-30

## 2020-06-30 VITALS
OXYGEN SATURATION: 98 % | HEART RATE: 78 BPM | RESPIRATION RATE: 18 BRPM | SYSTOLIC BLOOD PRESSURE: 115 MMHG | TEMPERATURE: 98 F | DIASTOLIC BLOOD PRESSURE: 78 MMHG

## 2020-06-30 LAB
ANION GAP SERPL CALC-SCNC: 9 MMOL/L — SIGNIFICANT CHANGE UP (ref 5–17)
APTT BLD: 27.5 SEC — SIGNIFICANT CHANGE UP (ref 27.5–35.5)
BASOPHILS # BLD AUTO: 0.02 K/UL — SIGNIFICANT CHANGE UP (ref 0–0.2)
BASOPHILS NFR BLD AUTO: 0.3 % — SIGNIFICANT CHANGE UP (ref 0–2)
BUN SERPL-MCNC: <4 MG/DL — LOW (ref 7–23)
CALCIUM SERPL-MCNC: 8.4 MG/DL — SIGNIFICANT CHANGE UP (ref 8.4–10.5)
CHLORIDE SERPL-SCNC: 111 MMOL/L — HIGH (ref 96–108)
CO2 SERPL-SCNC: 21 MMOL/L — LOW (ref 22–31)
CREAT SERPL-MCNC: 0.63 MG/DL — SIGNIFICANT CHANGE UP (ref 0.5–1.3)
EOSINOPHIL # BLD AUTO: 0.07 K/UL — SIGNIFICANT CHANGE UP (ref 0–0.5)
EOSINOPHIL NFR BLD AUTO: 1.1 % — SIGNIFICANT CHANGE UP (ref 0–6)
GLUCOSE SERPL-MCNC: 101 MG/DL — HIGH (ref 70–99)
HCT VFR BLD CALC: 31 % — LOW (ref 34.5–45)
HGB BLD-MCNC: 10.6 G/DL — LOW (ref 11.5–15.5)
IMM GRANULOCYTES NFR BLD AUTO: 0.5 % — SIGNIFICANT CHANGE UP (ref 0–1.5)
INR BLD: 1.19 RATIO — HIGH (ref 0.88–1.16)
LYMPHOCYTES # BLD AUTO: 1.71 K/UL — SIGNIFICANT CHANGE UP (ref 1–3.3)
LYMPHOCYTES # BLD AUTO: 27.5 % — SIGNIFICANT CHANGE UP (ref 13–44)
MCHC RBC-ENTMCNC: 30.7 PG — SIGNIFICANT CHANGE UP (ref 27–34)
MCHC RBC-ENTMCNC: 34.2 GM/DL — SIGNIFICANT CHANGE UP (ref 32–36)
MCV RBC AUTO: 89.9 FL — SIGNIFICANT CHANGE UP (ref 80–100)
MONOCYTES # BLD AUTO: 0.51 K/UL — SIGNIFICANT CHANGE UP (ref 0–0.9)
MONOCYTES NFR BLD AUTO: 8.2 % — SIGNIFICANT CHANGE UP (ref 2–14)
NEUTROPHILS # BLD AUTO: 3.87 K/UL — SIGNIFICANT CHANGE UP (ref 1.8–7.4)
NEUTROPHILS NFR BLD AUTO: 62.4 % — SIGNIFICANT CHANGE UP (ref 43–77)
NRBC # BLD: 0 /100 WBCS — SIGNIFICANT CHANGE UP (ref 0–0)
PLATELET # BLD AUTO: 170 K/UL — SIGNIFICANT CHANGE UP (ref 150–400)
POTASSIUM SERPL-MCNC: 4.2 MMOL/L — SIGNIFICANT CHANGE UP (ref 3.5–5.3)
POTASSIUM SERPL-SCNC: 4.2 MMOL/L — SIGNIFICANT CHANGE UP (ref 3.5–5.3)
PROTHROM AB SERPL-ACNC: 13.5 SEC — SIGNIFICANT CHANGE UP (ref 10.6–13.6)
RBC # BLD: 3.45 M/UL — LOW (ref 3.8–5.2)
RBC # FLD: 14.3 % — SIGNIFICANT CHANGE UP (ref 10.3–14.5)
SODIUM SERPL-SCNC: 141 MMOL/L — SIGNIFICANT CHANGE UP (ref 135–145)
WBC # BLD: 6.21 K/UL — SIGNIFICANT CHANGE UP (ref 3.8–10.5)
WBC # FLD AUTO: 6.21 K/UL — SIGNIFICANT CHANGE UP (ref 3.8–10.5)

## 2020-06-30 PROCEDURE — 86900 BLOOD TYPING SEROLOGIC ABO: CPT

## 2020-06-30 PROCEDURE — 85027 COMPLETE CBC AUTOMATED: CPT

## 2020-06-30 PROCEDURE — 96372 THER/PROPH/DIAG INJ SC/IM: CPT

## 2020-06-30 PROCEDURE — P9016: CPT

## 2020-06-30 PROCEDURE — 80053 COMPREHEN METABOLIC PANEL: CPT

## 2020-06-30 PROCEDURE — 85384 FIBRINOGEN ACTIVITY: CPT

## 2020-06-30 PROCEDURE — 86850 RBC ANTIBODY SCREEN: CPT

## 2020-06-30 PROCEDURE — 88305 TISSUE EXAM BY PATHOLOGIST: CPT

## 2020-06-30 PROCEDURE — 84702 CHORIONIC GONADOTROPIN TEST: CPT

## 2020-06-30 PROCEDURE — 99285 EMERGENCY DEPT VISIT HI MDM: CPT | Mod: 25

## 2020-06-30 PROCEDURE — 86901 BLOOD TYPING SEROLOGIC RH(D): CPT

## 2020-06-30 PROCEDURE — 83690 ASSAY OF LIPASE: CPT

## 2020-06-30 PROCEDURE — 36430 TRANSFUSION BLD/BLD COMPNT: CPT

## 2020-06-30 PROCEDURE — 86923 COMPATIBILITY TEST ELECTRIC: CPT

## 2020-06-30 PROCEDURE — 85610 PROTHROMBIN TIME: CPT

## 2020-06-30 PROCEDURE — 87635 SARS-COV-2 COVID-19 AMP PRB: CPT

## 2020-06-30 PROCEDURE — 85730 THROMBOPLASTIN TIME PARTIAL: CPT

## 2020-06-30 PROCEDURE — 80048 BASIC METABOLIC PNL TOTAL CA: CPT

## 2020-06-30 PROCEDURE — 88304 TISSUE EXAM BY PATHOLOGIST: CPT

## 2020-06-30 RX ADMIN — Medication 975 MILLIGRAM(S): at 08:07

## 2020-06-30 RX ADMIN — SODIUM CHLORIDE 3 MILLILITER(S): 9 INJECTION INTRAMUSCULAR; INTRAVENOUS; SUBCUTANEOUS at 06:19

## 2020-06-30 RX ADMIN — Medication 975 MILLIGRAM(S): at 01:50

## 2020-06-30 NOTE — DISCHARGE NOTE NURSING/CASE MANAGEMENT/SOCIAL WORK - PATIENT PORTAL LINK FT
You can access the FollowMyHealth Patient Portal offered by [facility] by registering at the following website: [email or web address]. By joining [facility]’s FollowMyHealth portal, you will also be able to view your health information using other applications (apps) compatible with our system.

## 2020-06-30 NOTE — PROGRESS NOTE ADULT - PROBLEM SELECTOR PLAN 1
Neuro: PO pain meds, tylenol and motrin  CV: Hemodynamically stable; f/u AM CBC/BMP/COAGS  Pulm: Saturating well on room air, encourage oob/amb  GI: Continue regular diet  : Voiding spontaneously  Heme: c/w SCDs for DVT ppx  -s/p 3uPRBC with normalization of H/H  - s/p cytotec, methergine x2, hemabatex2, TXA  FEN: SLIV.  replete electrolytes prn   ID: Afebrile  Endo: No active issues   Dispo: Discharge planning    MARGAUX Og PGY-2

## 2020-06-30 NOTE — PROGRESS NOTE ADULT - ASSESSMENT
A/P: 34y POD#1 s/p D&C 2/2 Incomplete  with active hemorrhage. Pt is recovering well post-operatively with improvements in clinical symptoms, hemodynamically stable.

## 2020-06-30 NOTE — PROGRESS NOTE ADULT - SUBJECTIVE AND OBJECTIVE BOX
R1 GYN Progress Note    POD#1   HD#2    Patient seen and examined at bedside.  No acute events overnight. Pt reports resolution in dizziness and lightheadedness. Reports minimal vaginal bleeding overnight. Pain well controlled. denies abdominal pain/cramping.  Patient is ambulating and tolerating regular diet.  Patient is passing flatus.    Patient is voiding spontaneously  Denies CP, SOB, N/V, fevers, and chills.    Vital Signs Last 24 Hours  T(C): 36.8 (06-30-20 @ 05:45), Max: 37.1 (06-29-20 @ 08:23)  HR: 89 (06-30-20 @ 05:45) (79 - 120)  BP: 105/61 (06-30-20 @ 05:45) (70/38 - 134/99)  RR: 18 (06-30-20 @ 05:45) (14 - 26)  SpO2: 99% (06-30-20 @ 05:45) (96% - 100%)    I&O's Summary    29 Jun 2020 07:01  -  30 Jun 2020 07:00  --------------------------------------------------------  IN: 1000 mL / OUT: 1200 mL / NET: -200 mL        Physical Exam:  General: NAD  CV: RR  Lungs: CTA b/l  Abdomen: Soft, nontender, nondistended, tympanic, normoactive bowel sounds  : minimal bleeding on pad  Ext: No pain or swelling     Labs:                        12.0   15.57 )-----------( 216      ( 29 Jun 2020 14:50 )             34.6   baso x      eos x      imm gran x      lymph x      mono x      poly x                            6.3    9.29  )-----------( 102      ( 29 Jun 2020 11:34 )             18.9   baso x      eos x      imm gran x      lymph x      mono x      poly x                            13.5   10.18 )-----------( 423      ( 29 Jun 2020 08:24 )             40.5   baso 0.4    eos 1.3    imm gran 0.3    lymph 34.3   mono 8.2    poly 55.5       MEDICATIONS  (STANDING):  acetaminophen   Tablet .. 975 milliGRAM(s) Oral every 6 hours  sodium chloride 0.9% lock flush 3 milliLiter(s) IV Push every 8 hours    MEDICATIONS  (PRN):  ibuprofen  Tablet. 600 milliGRAM(s) Oral every 6 hours PRN Moderate Pain (4 - 6)

## 2020-07-01 LAB — CHROM ANALY OVERALL INTERP SPEC-IMP: SIGNIFICANT CHANGE UP

## 2020-07-07 ENCOUNTER — EMERGENCY (EMERGENCY)
Facility: HOSPITAL | Age: 35
LOS: 1 days | Discharge: HOME | End: 2020-07-07
Attending: EMERGENCY MEDICINE
Payer: COMMERCIAL

## 2020-07-07 VITALS
OXYGEN SATURATION: 97 % | HEART RATE: 88 BPM | DIASTOLIC BLOOD PRESSURE: 75 MMHG | SYSTOLIC BLOOD PRESSURE: 130 MMHG | RESPIRATION RATE: 19 BRPM

## 2020-07-07 VITALS
TEMPERATURE: 98 F | HEART RATE: 126 BPM | SYSTOLIC BLOOD PRESSURE: 122 MMHG | OXYGEN SATURATION: 100 % | WEIGHT: 134.92 LBS | HEIGHT: 67 IN | RESPIRATION RATE: 18 BRPM | DIASTOLIC BLOOD PRESSURE: 79 MMHG

## 2020-07-07 DIAGNOSIS — Z90.49 ACQUIRED ABSENCE OF OTHER SPECIFIED PARTS OF DIGESTIVE TRACT: Chronic | ICD-10-CM

## 2020-07-07 LAB
ALBUMIN SERPL ELPH-MCNC: 4.7 G/DL — SIGNIFICANT CHANGE UP (ref 3.3–5)
ALP SERPL-CCNC: 60 U/L — SIGNIFICANT CHANGE UP (ref 40–120)
ALT FLD-CCNC: 15 U/L — SIGNIFICANT CHANGE UP (ref 10–45)
ANION GAP SERPL CALC-SCNC: 15 MMOL/L — SIGNIFICANT CHANGE UP (ref 5–17)
ANION GAP SERPL CALC-SCNC: 18 MMOL/L — HIGH (ref 5–17)
APTT BLD: 30.7 SEC — SIGNIFICANT CHANGE UP (ref 27.5–35.5)
AST SERPL-CCNC: 16 U/L — SIGNIFICANT CHANGE UP (ref 10–40)
BASOPHILS # BLD AUTO: 0.02 K/UL — SIGNIFICANT CHANGE UP (ref 0–0.2)
BASOPHILS NFR BLD AUTO: 0.2 % — SIGNIFICANT CHANGE UP (ref 0–2)
BILIRUB SERPL-MCNC: 0.3 MG/DL — SIGNIFICANT CHANGE UP (ref 0.2–1.2)
BLD GP AB SCN SERPL QL: NEGATIVE — SIGNIFICANT CHANGE UP
BUN SERPL-MCNC: 5 MG/DL — LOW (ref 7–23)
BUN SERPL-MCNC: 6 MG/DL — LOW (ref 7–23)
CALCIUM SERPL-MCNC: 8.3 MG/DL — LOW (ref 8.4–10.5)
CALCIUM SERPL-MCNC: 9.3 MG/DL — SIGNIFICANT CHANGE UP (ref 8.4–10.5)
CHLORIDE SERPL-SCNC: 102 MMOL/L — SIGNIFICANT CHANGE UP (ref 96–108)
CHLORIDE SERPL-SCNC: 109 MMOL/L — HIGH (ref 96–108)
CO2 SERPL-SCNC: 16 MMOL/L — LOW (ref 22–31)
CO2 SERPL-SCNC: 17 MMOL/L — LOW (ref 22–31)
CREAT SERPL-MCNC: 0.68 MG/DL — SIGNIFICANT CHANGE UP (ref 0.5–1.3)
CREAT SERPL-MCNC: 0.74 MG/DL — SIGNIFICANT CHANGE UP (ref 0.5–1.3)
D DIMER BLD IA.RAPID-MCNC: <150 NG/ML DDU — SIGNIFICANT CHANGE UP
EOSINOPHIL # BLD AUTO: 0.01 K/UL — SIGNIFICANT CHANGE UP (ref 0–0.5)
EOSINOPHIL NFR BLD AUTO: 0.1 % — SIGNIFICANT CHANGE UP (ref 0–6)
GLUCOSE SERPL-MCNC: 101 MG/DL — HIGH (ref 70–99)
GLUCOSE SERPL-MCNC: 123 MG/DL — HIGH (ref 70–99)
HCT VFR BLD CALC: 40 % — SIGNIFICANT CHANGE UP (ref 34.5–45)
HGB BLD-MCNC: 13.3 G/DL — SIGNIFICANT CHANGE UP (ref 11.5–15.5)
IMM GRANULOCYTES NFR BLD AUTO: 0.6 % — SIGNIFICANT CHANGE UP (ref 0–1.5)
INR BLD: 1.11 RATIO — SIGNIFICANT CHANGE UP (ref 0.88–1.16)
LYMPHOCYTES # BLD AUTO: 1.44 K/UL — SIGNIFICANT CHANGE UP (ref 1–3.3)
LYMPHOCYTES # BLD AUTO: 14.5 % — SIGNIFICANT CHANGE UP (ref 13–44)
MCHC RBC-ENTMCNC: 30.4 PG — SIGNIFICANT CHANGE UP (ref 27–34)
MCHC RBC-ENTMCNC: 33.3 GM/DL — SIGNIFICANT CHANGE UP (ref 32–36)
MCV RBC AUTO: 91.5 FL — SIGNIFICANT CHANGE UP (ref 80–100)
MONOCYTES # BLD AUTO: 0.66 K/UL — SIGNIFICANT CHANGE UP (ref 0–0.9)
MONOCYTES NFR BLD AUTO: 6.6 % — SIGNIFICANT CHANGE UP (ref 2–14)
NEUTROPHILS # BLD AUTO: 7.75 K/UL — HIGH (ref 1.8–7.4)
NEUTROPHILS NFR BLD AUTO: 78 % — HIGH (ref 43–77)
NRBC # BLD: 0 /100 WBCS — SIGNIFICANT CHANGE UP (ref 0–0)
PLATELET # BLD AUTO: 396 K/UL — SIGNIFICANT CHANGE UP (ref 150–400)
POTASSIUM SERPL-MCNC: 3.4 MMOL/L — LOW (ref 3.5–5.3)
POTASSIUM SERPL-MCNC: 3.6 MMOL/L — SIGNIFICANT CHANGE UP (ref 3.5–5.3)
POTASSIUM SERPL-SCNC: 3.4 MMOL/L — LOW (ref 3.5–5.3)
POTASSIUM SERPL-SCNC: 3.6 MMOL/L — SIGNIFICANT CHANGE UP (ref 3.5–5.3)
PROT SERPL-MCNC: 7.5 G/DL — SIGNIFICANT CHANGE UP (ref 6–8.3)
PROTHROM AB SERPL-ACNC: 12.6 SEC — SIGNIFICANT CHANGE UP (ref 10.6–13.6)
RBC # BLD: 4.37 M/UL — SIGNIFICANT CHANGE UP (ref 3.8–5.2)
RBC # FLD: 13.5 % — SIGNIFICANT CHANGE UP (ref 10.3–14.5)
RH IG SCN BLD-IMP: POSITIVE — SIGNIFICANT CHANGE UP
SODIUM SERPL-SCNC: 137 MMOL/L — SIGNIFICANT CHANGE UP (ref 135–145)
SODIUM SERPL-SCNC: 140 MMOL/L — SIGNIFICANT CHANGE UP (ref 135–145)
SURGICAL PATHOLOGY STUDY: SIGNIFICANT CHANGE UP
WBC # BLD: 9.94 K/UL — SIGNIFICANT CHANGE UP (ref 3.8–10.5)
WBC # FLD AUTO: 9.94 K/UL — SIGNIFICANT CHANGE UP (ref 3.8–10.5)

## 2020-07-07 PROCEDURE — 85379 FIBRIN DEGRADATION QUANT: CPT

## 2020-07-07 PROCEDURE — 86901 BLOOD TYPING SEROLOGIC RH(D): CPT

## 2020-07-07 PROCEDURE — 86850 RBC ANTIBODY SCREEN: CPT

## 2020-07-07 PROCEDURE — 93010 ELECTROCARDIOGRAM REPORT: CPT | Mod: NC

## 2020-07-07 PROCEDURE — 80053 COMPREHEN METABOLIC PANEL: CPT

## 2020-07-07 PROCEDURE — 93005 ELECTROCARDIOGRAM TRACING: CPT

## 2020-07-07 PROCEDURE — 99284 EMERGENCY DEPT VISIT MOD MDM: CPT | Mod: 25

## 2020-07-07 PROCEDURE — 80048 BASIC METABOLIC PNL TOTAL CA: CPT

## 2020-07-07 PROCEDURE — 85610 PROTHROMBIN TIME: CPT

## 2020-07-07 PROCEDURE — 86900 BLOOD TYPING SEROLOGIC ABO: CPT

## 2020-07-07 PROCEDURE — 99285 EMERGENCY DEPT VISIT HI MDM: CPT

## 2020-07-07 PROCEDURE — 85027 COMPLETE CBC AUTOMATED: CPT

## 2020-07-07 PROCEDURE — 84443 ASSAY THYROID STIM HORMONE: CPT

## 2020-07-07 PROCEDURE — 85730 THROMBOPLASTIN TIME PARTIAL: CPT

## 2020-07-07 PROCEDURE — 96374 THER/PROPH/DIAG INJ IV PUSH: CPT

## 2020-07-07 RX ORDER — SODIUM CHLORIDE 9 MG/ML
1000 INJECTION INTRAMUSCULAR; INTRAVENOUS; SUBCUTANEOUS ONCE
Refills: 0 | Status: COMPLETED | OUTPATIENT
Start: 2020-07-07 | End: 2020-07-07

## 2020-07-07 RX ORDER — ONDANSETRON 8 MG/1
4 TABLET, FILM COATED ORAL ONCE
Refills: 0 | Status: COMPLETED | OUTPATIENT
Start: 2020-07-07 | End: 2020-07-07

## 2020-07-07 RX ORDER — ONDANSETRON 8 MG/1
1 TABLET, FILM COATED ORAL
Qty: 9 | Refills: 0
Start: 2020-07-07 | End: 2020-07-09

## 2020-07-07 RX ADMIN — SODIUM CHLORIDE 1000 MILLILITER(S): 9 INJECTION INTRAMUSCULAR; INTRAVENOUS; SUBCUTANEOUS at 17:37

## 2020-07-07 RX ADMIN — ONDANSETRON 4 MILLIGRAM(S): 8 TABLET, FILM COATED ORAL at 17:50

## 2020-07-07 RX ADMIN — SODIUM CHLORIDE 1000 MILLILITER(S): 9 INJECTION INTRAMUSCULAR; INTRAVENOUS; SUBCUTANEOUS at 19:39

## 2020-07-07 NOTE — ED ADULT TRIAGE NOTE - BP NONINVASIVE SYSTOLIC (MM HG)
122 I have personally performed a face to face diagnostic evaluation on this patient. I have reviewed the ACP note and agree with the history, exam and plan of care, except as noted.

## 2020-07-07 NOTE — ED PROVIDER NOTE - ATTENDING CONTRIBUTION TO CARE
Patient s/p emergency D&C 1 week ago (miscarriage in early pregnancy associated with significant bleeding, received transfusions emergently) since that point reporting that she has been feeling anxious and having palpitations, now associated with severe nausea and that everything she eats makes her gag.  Some occasional shortness of breath associated.  Denying chest pains, abdominal pains, nausea, vomiting, vaginal bleeding.  Saw her OBGYN today who sent her to the Emergency Department for evaluation.    A 14 point review of systems is negative except as in HPI or otherwise documented.    Exam:  General: Patient well appearing, tachycardic otherwise vital signs within normal limits  HEENT: airway patent with moist mucous membranes  Cardiac: regular tachycardia with strong peripheral pulses  Respiratory: no respiratory distress  GI: abdomen soft, non tender, non distended  Neuro: no gross neurologic deficits  Skin: warm, well perfused  Psych: normal mood and affect    Patient presenting with tachycardia and nausea after procedure, sent by her OBGYN.  Tachycardia could be dehydration, also on differential would be continued blood loss (no reported bleeding and abdominal exam benign) so less likely, hyperthyroidism, PE? - plan for screening labs, intravenous fluids bolus, OBGYN consult, possible imaging studies.

## 2020-07-07 NOTE — ED PROVIDER NOTE - PROGRESS NOTE DETAILS
josh: pt HR now 70s after fluids, per OB if pt continues responding will be ok for dispo and outpt f/u. Hgb wnl, much lower concern for symptomatic anemia. josh: hr 80s, pt feels very well after fluids, will dc w/ zofran odt, pt to f/u with ob outpt. dimer neg.  Pt was re-evaluated at bedside, VSS, feeling better overall. Results were discussed with patient as well as return precautions and follow up plan with PCP and/or specialist. Time was taken to answer any questions that the patient had before providing them with discharge paperwork.

## 2020-07-07 NOTE — CONSULT NOTE ADULT - ASSESSMENT
35 yo  s/p D&C for incomplete  at 15 weeks and active hemorrhage presenting with shakes, weakness, nausea, and inability to tolerate PO, concerning for symptomatic anemia. Pt tachycardic on arrival s/p 1L bolus with resolution. Symptoms likely related to stress induced from traumatic incident

## 2020-07-07 NOTE — ED PROVIDER NOTE - NSFOLLOWUPINSTRUCTIONS_ED_ALL_ED_FT
Please follow up with your primary care provider for further concerns you may have regarding your general health. Attached you will find your results from today's visit. Continue taking your medications as prescribed and keep your upcoming medical appointments.    Take your medications as prescribed, and follow up with your OBGYN as we discussed. Please return if you develop new severe bleeding.

## 2020-07-07 NOTE — ED ADULT NURSE NOTE - OBJECTIVE STATEMENT
34 year old female presents to the ED via wheelchair through waiting room from home for nausea and anxiety x 8 says since d&c. Pt. was 15 weeks pregnant, was having bleeding, obgyn scheduled medical D&C. . No other PMH. Pt. is not currently bleeding vaginally and denies vaginal discharge or pain. Pt. denies abdominal pain/pelvic pain. Pt. states she took 0.5 mg of xanax as per her OBGYN last night and tonight and this morning at 0700, slept after, and woke up feeling anxious again. Pt. endorses nausea but denies vomiting. Patient endorses anxiety/sadness since d&c. Denies SI/HI, never had anxiety/depression in past. 20g peripheral IV placed and labs drawn and sent to lab. Patient undressed and placed into gown, call bell in hand and side rails up with bed in lowest position for safety. blanket provided. Comfort and safety provided.

## 2020-07-07 NOTE — CONSULT NOTE ADULT - PROBLEM SELECTOR RECOMMENDATION 9
- no acute gyn intervention  - H/H stable, symptoms likely not related to anemia  - no indication for imaging  - f/u TSH  - will likely need psychosocial assessment outpatient 2/2 traumatic event  - OK for discharge and f/u with OB provider per Gyn perspective    MARGAUX Og, PGY-2  d/w Aryan

## 2020-07-07 NOTE — ED ADULT NURSE NOTE - CHPI ED NUR SYMPTOMS NEG
no abdominal pain/no discharge/no back pain/no coffee grounds emesis/no pain/no vomiting/no vaginal discharge/no fever

## 2020-07-07 NOTE — ED ADULT NURSE NOTE - NSIMPLEMENTINTERV_GEN_ALL_ED
Implemented All Universal Safety Interventions:  Bland to call system. Call bell, personal items and telephone within reach. Instruct patient to call for assistance. Room bathroom lighting operational. Non-slip footwear when patient is off stretcher. Physically safe environment: no spills, clutter or unnecessary equipment. Stretcher in lowest position, wheels locked, appropriate side rails in place.

## 2020-07-07 NOTE — ED PROVIDER NOTE - NS ED ROS FT
Gen: Denies fever, weight loss   CV: Denies chest pain, + palpitations   Skin: Denies rash, erythema, color changes  Resp: Denies + SOB, no cough   Endo: Denies sensitivity to heat, cold, increased urination  GI: Denies constipation, vomiting, + nausea   Msk: Denies back pain, LE swelling, extremity pain  : Denies dysuria, increased frequency  Neuro: Denies LOC, seizures, + weakness   Psych: Denies hx of psych, hallucinations

## 2020-07-07 NOTE — CONSULT NOTE ADULT - SUBJECTIVE AND OBJECTIVE BOX
JESSICA JACOBS  34y  Female [id number]    33 yo  s/p D&C for incomplete  at 15 weeks and active hemorrhage presenting with shakes, nausea, and inability to tolerate PO. Pt was seen in ED on  with active hemorrhage; received 3uPRBC in ED and multiple uterotonics. She was taken to OR for emergent D&C. Pt had uncomplicated post-operative course and was discharged on post-operative day 1 in stable condition with normal H/H. She returns today complaining of "feeling off," stating she hasn't had an appetite she feels week, shaky and cannot sleep. She called the service line when she couldn't sleep last night, prescribed Xanax which she took but didn't help. She otherwise reports no vaginal bleeding or discharge. Denies fevers/chills, vomiting, chest pain, shortness of breath, change in urinary or bowel habits.    Obhx:  2017 c/b hemorrhage requiring multiple uterotonics. Pt states she almost received transfusion.  2019, uncomplicated; Incomplete Ab  described above  GynHx: Denies fibroids, cysts, abnormal pap smears, STIs  PMH: denies  PSH: Laparoscopic appendectomy  Social: Denies Toxic Habits x3; denies anxiety/depression  Meds: none  All: Bactrim, Amoxicillin  Allergies: [facility] Meds:   MEDICATIONS  (STANDING):    MEDICATIONS  (PRN):      Allergies    amoxicillin (Unknown)  Bactrim (Unknown)    Intolerances        PAST MEDICAL & SURGICAL HISTORY:  Spontaneous vaginal delivery  Hemorrhage after vaginal delivery  History of laparoscopic appendectomy          Vital Signs Last 24 Hrs  T(C): 36.8 (2020 17:04), Max: 36.8 (2020 16:31)  T(F): 98.2 (2020 17:04), Max: 98.3 (2020 16:31)  HR: 70 (2020 18:24) (70 - 129)  BP: 117/72 (2020 18:24) (117/72 - 141/84)  BP(mean): --  RR: 20 (2020 18:24) (18 - 20)  SpO2: 100% (2020 18:24) (100% - 100%)    Physical Exam:   General: sitting comfortably in bed, NAD   CV: RR S1S2 no m/r/g  Lungs: CTA b/l, unlabored on RA  Abd: Soft, non-tender, non-distended,  +BS   Ext: non-tender b/l, no edema     LABS:                              13.3   9.94  )-----------( 396      ( 2020 17:36 )             40.0     07    137  |  102  |  6<L>  ----------------------------<  123<H>  3.4<L>   |  17<L>  |  0.74    Ca    9.3      2020 17:36    TPro  7.5  /  Alb  4.7  /  TBili  0.3  /  DBili  x   /  AST  16  /  ALT  15  /  AlkPhos  60  07-07    I&O's Detail    PT/INR - ( 2020 17:36 )   PT: 12.6 sec;   INR: 1.11 ratio         PTT - ( 2020 17:36 )  PTT:30.7 sec

## 2020-07-07 NOTE — ED PROVIDER NOTE - PATIENT PORTAL LINK FT
You can access the FollowMyHealth Patient Portal offered by [facility] by registering at the following website: [email or web address]. By joining [email or web address] [email or web address] portal, you will also be able to view your health information using other applications (apps) compatible with our system.

## 2020-07-07 NOTE — ED PROVIDER NOTE - PHYSICAL EXAMINATION
Gen: WDWN, NAD, anxious appearing  HEENT: EOMI, no nasal discharge, mucous membranes moist  CV: tachycardic to 120s, no M/R/G  Resp: CTAB, no W/R/R  GI: Abdomen soft non-distended, mild TTP suprapubic, no masses  MSK: No open wounds, no bruising, no LE edema, swelling, or tenderness    Neuro: A&Ox4, following commands, moving all four extremities spontaneously  Psych: appropriate mood, denies AH, VH, SI

## 2020-07-07 NOTE — ED PROVIDER NOTE - OBJECTIVE STATEMENT
34F w/ PMH recent D&C 2/2 incomplete  complicated by active hemorrhage requiring 3 unit PRBC transfusion(20) p/w episodes of shaking, nausea since procedure. States since discharge she has been intermittently nauseous at home w/ associated bouts of anxiety, barely able to tolerate PO intake. Minimum improvement w/ 0.25 mg xanax last night, this am. Endorsing some associated palpitations, SOB during episodes but no CP, increased WOB, no abdominal pain, mild suprapubic tenderness unchanged since procedure, no urinary symptoms and states vaginal spotting ceased 4 days ago w/o any discharge. No f/c/v/d. No leg swelling or calf tenderness. No SI/HI, endorsing familial support. 34F w/ PMH recent D&C 2/2 incomplete  complicated by active hemorrhage requiring 3 unit PRBC transfusion(20) p/w episodes of shaking, nausea since procedure. States since discharge she has been intermittently nauseous at home w/ associated bouts of anxiety, barely able to tolerate PO intake. Minimum improvement w/ 0.25 mg xanax last night, this am. Endorsing some associated palpitations, SOB during episodes but no CP, increased WOB, no abdominal pain, mild suprapubic tenderness unchanged since procedure, no urinary symptoms and states vaginal spotting ceased 4 days ago w/o any discharge. No f/c/v/d. No leg swelling or calf tenderness. No SI/HI, endorsing familial support. Sent in by her OB (Dr. Oppenheim) for eval.

## 2020-07-07 NOTE — ED ADULT NURSE REASSESSMENT NOTE - NS ED NURSE REASSESS COMMENT FT1
Report received from RN Mirian and Sandie. Patient A&Ox4, breathing spontaneous and unlabored, sinus rhythm on cardiac monitor. Patient states she has dry mouth, feels a little dizzy and has a slight headache. Denies pain. Patient felt okay to ambulate with RN to restroom, steady gait noted. Unable to eat food from previous RN. Awaiting results of BNP. Bed locked and in lowest position for safety. Call bell at bedside.

## 2020-07-07 NOTE — ED ADULT NURSE REASSESSMENT NOTE - NS ED NURSE REASSESS COMMENT FT1
Patient reports dizziness has improved, ambulating to bathroom with steady gait noted. To be DIscharged.

## 2020-07-08 LAB — TSH SERPL-MCNC: 0.93 UIU/ML — SIGNIFICANT CHANGE UP (ref 0.27–4.2)

## 2020-07-16 NOTE — DISCHARGE NOTE OB - CRACKED, BLEEDING NIPPLES
7/16/2020         RE: Mateus Simons  [address]  [place] [address]        Dear Colleague,    Thank you for referring your patient, Mateus Simons, to the [facility]. Please see a copy of my visit note below.    HPI:  Mateus Simons is a 14 year old male patient here today for warts on hands .  Patient states this has been present for a while.  Patient reports the following symptoms: growing, bothersome .  Patient reports the following previous treatments: otc with no change ln2 with resolution of one wart.. LOV il candin, paring,  and wart peel with improvement. Patient reports the following modifying factors: none.  Associated symptoms: none.  Patient has no other skin complaints today.  Remainder of the HPI, Meds, PMH, Allergies, FH, and SH was reviewed in chart.    Pertinent Hx:   warts  History reviewed. No pertinent past medical history.    History reviewed. No pertinent surgical history.     History reviewed. No pertinent family history.    Social History     Socioeconomic History     Marital status: Single     Spouse name: Not on file     Number of children: Not on file     Years of education: Not on file     Highest education level: Not on file   Occupational History     Not on file   Social Needs     Financial resource strain: Not on file     Food insecurity     Worry: Not on file     Inability: Not on file     Transportation needs     Medical: Not on file     Non-medical: Not on file   Tobacco Use     Smoking status: Never Smoker     Smokeless tobacco: Never Used   Substance and Sexual Activity     Alcohol use: Never     Frequency: Never     Drug use: Never     Sexual activity: Never   Lifestyle     Physical activity     Days per week: Not on file     Minutes per session: Not on file     Stress: Not on file   Relationships     Social connections     Talks on phone: Not on file     Gets together: Not on file     Attends Protestant service: Not on file     Active member of club or  organization: Not on file     Attends meetings of clubs or organizations: Not on file     Relationship status: Not on file     Intimate partner violence     Fear of current or ex partner: Not on file     Emotionally abused: Not on file     Physically abused: Not on file     Forced sexual activity: Not on file   Other Topics Concern     Not on file   Social History Narrative     Not on file       No outpatient encounter medications on file as of 7/16/2020.     No facility-administered encounter medications on file as of 7/16/2020.        Review Of Systems:  Skin: warts  Eyes: negative  Ears/Nose/Throat: negative  Respiratory: No shortness of breath, dyspnea on exertion, cough, or hemoptysis  Cardiovascular: negative  Gastrointestinal: negative  Genitourinary: negative  Musculoskeletal: negative  Neurologic: negative  Psychiatric: negative  Hematologic/Lymphatic/Immunologic: negative  Endocrine: negative      Objective:     /62   Eyes: Conjunctivae/lids: Normal   ENT: Lips:  Normal  MSK: Normal  Cardiovascular: Peripheral edema none  Pulm: Breathing Normal  Neuro/Psych: Orientation: A/O x 3 Normal; Mood/Affect: Normal, NAD, WDWN  Pt accompanied by: mother  Following areas examined: Face ( patient is wearing face mask), neck, hands, forearms    Scales skin type:ii   Findings:  Flesh-colored verrucous appearing papule x 1 on right hand and 5 on left hand, right plantar foot x 1  Assessment and Plan:  1) common warts x 6  Pt deferred tx of wart on foot  Treatment options include Cimetidine, Aldara, Efudex, OTC topicals, wart peel, metaplast tape, candida injection, Bleomycin, cantharidin topical, cryo therapy, excision, and electrocautery.   .Pared down wart on right and left palm x 2 total:  After consent and prep, 15 blade used to pare down lesion. No complications and routine wound care.  May grow back and may get a scar.     IL Candin: PGACAC discussed.  Risks including but not limited to injection site  reaction, bruising, no resolution.  All questions answered and entertained to patient s satisfaction.  Informed consent obtained.  IL Candin in concentration of 1 unit/ 0.1 ml was injected ID to 5 warts.  Total injected was  6 units.  Patient tolerated without complications and given wound care instructions, including not to move product around.  Return in 4 weeks for follow-up and possible additional IL Candin.     Candin LOT #  EX: Aug 02,2021    Follow up in 3-4 weeks      Again, thank you for allowing me to participate in the care of your patient.        Sincerely,        Guadalupe Fuentes PA-C     Statement Selected

## 2020-09-01 ENCOUNTER — TRANSCRIPTION ENCOUNTER (OUTPATIENT)
Age: 35
End: 2020-09-01

## 2020-10-21 ENCOUNTER — OUTPATIENT (OUTPATIENT)
Dept: OUTPATIENT SERVICES | Facility: HOSPITAL | Age: 35
LOS: 1 days | End: 2020-10-21
Payer: COMMERCIAL

## 2020-10-21 DIAGNOSIS — Z90.49 ACQUIRED ABSENCE OF OTHER SPECIFIED PARTS OF DIGESTIVE TRACT: Chronic | ICD-10-CM

## 2020-10-21 DIAGNOSIS — Z11.59 ENCOUNTER FOR SCREENING FOR OTHER VIRAL DISEASES: ICD-10-CM

## 2020-10-21 PROCEDURE — U0003: CPT

## 2020-10-22 LAB — SARS-COV-2 RNA SPEC QL NAA+PROBE: SIGNIFICANT CHANGE UP

## 2020-10-23 ENCOUNTER — OUTPATIENT (OUTPATIENT)
Dept: OUTPATIENT SERVICES | Facility: HOSPITAL | Age: 35
LOS: 1 days | End: 2020-10-23
Payer: COMMERCIAL

## 2020-10-23 VITALS
RESPIRATION RATE: 18 BRPM | DIASTOLIC BLOOD PRESSURE: 79 MMHG | OXYGEN SATURATION: 95 % | HEART RATE: 88 BPM | HEIGHT: 67 IN | SYSTOLIC BLOOD PRESSURE: 118 MMHG | WEIGHT: 136.91 LBS | TEMPERATURE: 98 F

## 2020-10-23 VITALS — TEMPERATURE: 98 F | OXYGEN SATURATION: 97 % | RESPIRATION RATE: 18 BRPM | HEART RATE: 90 BPM

## 2020-10-23 DIAGNOSIS — R13.10 DYSPHAGIA, UNSPECIFIED: ICD-10-CM

## 2020-10-23 DIAGNOSIS — Z90.49 ACQUIRED ABSENCE OF OTHER SPECIFIED PARTS OF DIGESTIVE TRACT: Chronic | ICD-10-CM

## 2020-10-23 PROCEDURE — 36415 COLL VENOUS BLD VENIPUNCTURE: CPT

## 2020-10-23 PROCEDURE — 85246 CLOT FACTOR VIII VW ANTIGEN: CPT

## 2020-10-23 PROCEDURE — 85247 CLOT FACTOR VIII MULTIMETRIC: CPT

## 2020-10-23 PROCEDURE — 85245 CLOT FACTOR VIII VW RISTOCTN: CPT

## 2020-10-23 RX ORDER — DESMOPRESSIN ACETATE 0.1 MG/1
18 TABLET ORAL ONCE
Refills: 0 | Status: COMPLETED | OUTPATIENT
Start: 2020-10-23 | End: 2020-10-23

## 2020-10-23 RX ORDER — DIPHENHYDRAMINE HCL 50 MG
25 CAPSULE ORAL EVERY 4 HOURS
Refills: 0 | Status: DISCONTINUED | OUTPATIENT
Start: 2020-10-23 | End: 2020-11-06

## 2020-10-23 RX ADMIN — Medication 25 MILLIGRAM(S): at 13:21

## 2020-10-23 RX ADMIN — DESMOPRESSIN ACETATE 109 MICROGRAM(S): 0.1 TABLET ORAL at 12:44

## 2020-10-23 NOTE — PROVIDER CONTACT NOTE (OTHER) - ACTION/TREATMENT ORDERED:
per md ok to order benadryl 25 mg PO stat at this time.  Per md farrell to resume IV if reactions subside. No other new orders.
per md ok to restart infusion at this time. Ok per md to reduce rate. No other new orders.

## 2020-10-23 NOTE — PROVIDER CONTACT NOTE (OTHER) - ASSESSMENT
pt appears flushed /75 pulse 156 pulse ox 98% on RA and temp 98.6 Pt states some tingling. Infusion stopped at this time. pt appears flushed /75 pulse 156 pulse ox 98% on RA and temp 98.6 Pt states some tingling. Infusion stopped at this time. Pt states no SOB at this time.

## 2020-10-26 LAB
VWF AG ACT/NOR PPP IA: 108 % — SIGNIFICANT CHANGE UP (ref 63–170)
VWF AG ACT/NOR PPP IA: 212 % — HIGH (ref 63–170)
VWF:RCO ACT/NOR PPP PL AGG: 217 % — HIGH (ref 45–133)
VWF:RCO ACT/NOR PPP PL AGG: 97 % — SIGNIFICANT CHANGE UP (ref 45–133)

## 2020-11-11 LAB — VWF CBA/VWF AG PPP IA-RTO: SIGNIFICANT CHANGE UP

## 2020-11-13 LAB — VWF CBA/VWF AG PPP IA-RTO: SIGNIFICANT CHANGE UP

## 2020-12-09 ENCOUNTER — TRANSCRIPTION ENCOUNTER (OUTPATIENT)
Age: 35
End: 2020-12-09

## 2021-03-11 NOTE — DISCHARGE NOTE NURSING/CASE MANAGEMENT/SOCIAL WORK - NSDCPEFALRISK_GEN_ALL_CORE
[facility] pharmacy calling for a refill of phenobarbital  Ashley Phillips that patient is completely out of the medication  Please sign if agreeable  Patient information on fall and injury prevention

## 2021-07-05 ENCOUNTER — TRANSCRIPTION ENCOUNTER (OUTPATIENT)
Age: 36
End: 2021-07-05

## 2021-11-09 NOTE — ED ADULT NURSE NOTE - CHIEF COMPLAINT
The patient is a 34y Female complaining of nausea. Modified Advancement Flap Text: The defect edges were debeveled with a #15 scalpel blade.  Given the location of the defect, shape of the defect and the proximity to free margins a modified advancement flap was deemed most appropriate.  Using a sterile surgical marker, an appropriate advancement flap was drawn incorporating the defect and placing the expected incisions within the relaxed skin tension lines where possible.    The area thus outlined was incised deep to adipose tissue with a #15 scalpel blade.  The skin margins were undermined to an appropriate distance in all directions utilizing iris scissors.

## 2021-11-10 ENCOUNTER — TRANSCRIPTION ENCOUNTER (OUTPATIENT)
Age: 36
End: 2021-11-10

## 2021-11-15 ENCOUNTER — OUTPATIENT (OUTPATIENT)
Dept: OUTPATIENT SERVICES | Facility: HOSPITAL | Age: 36
LOS: 1 days | End: 2021-11-15
Payer: COMMERCIAL

## 2021-11-15 VITALS
HEART RATE: 136 BPM | RESPIRATION RATE: 18 BRPM | SYSTOLIC BLOOD PRESSURE: 123 MMHG | DIASTOLIC BLOOD PRESSURE: 87 MMHG | TEMPERATURE: 98 F

## 2021-11-15 VITALS — SYSTOLIC BLOOD PRESSURE: 113 MMHG | HEART RATE: 107 BPM | DIASTOLIC BLOOD PRESSURE: 73 MMHG | TEMPERATURE: 98 F

## 2021-11-15 DIAGNOSIS — O26.899 OTHER SPECIFIED PREGNANCY RELATED CONDITIONS, UNSPECIFIED TRIMESTER: ICD-10-CM

## 2021-11-15 DIAGNOSIS — Z90.49 ACQUIRED ABSENCE OF OTHER SPECIFIED PARTS OF DIGESTIVE TRACT: Chronic | ICD-10-CM

## 2021-11-15 DIAGNOSIS — Z3A.00 WEEKS OF GESTATION OF PREGNANCY NOT SPECIFIED: ICD-10-CM

## 2021-11-15 DIAGNOSIS — Z98.890 OTHER SPECIFIED POSTPROCEDURAL STATES: Chronic | ICD-10-CM

## 2021-11-15 LAB
HCT VFR BLD CALC: 35.5 % — SIGNIFICANT CHANGE UP (ref 34.5–45)
HGB BLD-MCNC: 12.1 G/DL — SIGNIFICANT CHANGE UP (ref 11.5–15.5)
MCHC RBC-ENTMCNC: 30.3 PG — SIGNIFICANT CHANGE UP (ref 27–34)
MCHC RBC-ENTMCNC: 34.1 GM/DL — SIGNIFICANT CHANGE UP (ref 32–36)
MCV RBC AUTO: 88.8 FL — SIGNIFICANT CHANGE UP (ref 80–100)
NRBC # BLD: 0 /100 WBCS — SIGNIFICANT CHANGE UP (ref 0–0)
PLATELET # BLD AUTO: 216 K/UL — SIGNIFICANT CHANGE UP (ref 150–400)
RBC # BLD: 4 M/UL — SIGNIFICANT CHANGE UP (ref 3.8–5.2)
RBC # FLD: 12.8 % — SIGNIFICANT CHANGE UP (ref 10.3–14.5)
WBC # BLD: 13.9 K/UL — HIGH (ref 3.8–10.5)
WBC # FLD AUTO: 13.9 K/UL — HIGH (ref 3.8–10.5)

## 2021-11-15 PROCEDURE — 85027 COMPLETE CBC AUTOMATED: CPT

## 2021-11-15 PROCEDURE — G0463: CPT

## 2021-11-15 PROCEDURE — 36415 COLL VENOUS BLD VENIPUNCTURE: CPT

## 2021-11-15 PROCEDURE — 59025 FETAL NON-STRESS TEST: CPT

## 2021-11-15 PROCEDURE — 99214 OFFICE O/P EST MOD 30 MIN: CPT

## 2021-11-15 RX ORDER — SODIUM CHLORIDE 9 MG/ML
1000 INJECTION, SOLUTION INTRAVENOUS ONCE
Refills: 0 | Status: DISCONTINUED | OUTPATIENT
Start: 2021-11-15 | End: 2021-11-30

## 2021-11-15 NOTE — OB PROVIDER TRIAGE NOTE - NSHPPHYSICALEXAM_GEN_ALL_CORE
Vital Signs Last 24 Hrs  T(C): 36.7 (15 Nov 2021 18:21), Max: 36.7 (15 Nov 2021 18:21)  T(F): 98.1 (15 Nov 2021 18:21), Max: 98.1 (15 Nov 2021 18:21)  HR: 127 (15 Nov 2021 18:56) (118 - 141)  BP: 123/87 (15 Nov 2021 18:26) (123/87 - 123/87)  BP(mean): --  RR: 18 (15 Nov 2021 18:21) (18 - 18)  SpO2: 97% (15 Nov 2021 18:56) (97% - 99%)    General: NAD, A&Ox3  CV: RRR  Lungs: CTA b/l  Abdomen: Soft, NT, gravid

## 2021-11-15 NOTE — OB PROVIDER TRIAGE NOTE - HISTORY OF PRESENT ILLNESS
PA Note:  36y  @38wks and 0 days gestation presenting with contractions. Patient admits to irregular contractions starting last night that are rated as a 3/10. She denies LOF or VB. PNC uncomplicated. +FM. GBS -. EFW 6#5 done by ultrasound @35wks gestation. Patient states she was 2cm dilated on Friday in OB office.    POBHx: 2017-, FT, 9#, c/b manual removal of placenta and PPH not requiring a blood transfusion. 2019-, FT, 9#. 2020-MAB @16wks gestation requiring a D&C, c/b hemorrhage requiring 3u of PRBC's  PGYNHx: Denies fibroids, ovarian cysts, abnormal pap smears, STD's  PMHx: Anxiety-no meds  Medications: PNV, Diclegis  Allergies: Amoxicillin-vomiting, Sulfa drugs-leg numbness  PSHx: Lsc appendectomy in , D&C in   Social Hx: Denies etoh/tobacco/drug use    Vital Signs Last 24 Hrs  T(C): 36.7 (15 Nov 2021 18:21), Max: 36.7 (15 Nov 2021 18:21)  T(F): 98.1 (15 Nov 2021 18:21), Max: 98.1 (15 Nov 2021 18:21)  HR: 118 (15 Nov 2021 18:51) (118 - 141)  BP: 123/87 (15 Nov 2021 18:26) (123/87 - 123/87)  BP(mean): --  RR: 18 (15 Nov 2021 18:21) (18 - 18)  SpO2: 97% (15 Nov 2021 18:51) (97% - 99%)    General: NAD, A&Ox3  CV: RRR  Lungs: CTA b/l  Abdomen: Soft, NT, gravid    VE: 2/60/-3  EFM: 140/moderate variability/+accels/no decels  [place]: Irregular, q10m

## 2021-11-15 NOTE — OB PROVIDER TRIAGE NOTE - NS_STATION_OBGYN_ALL_OB_NU
-3 Banner Transposition Flap Text: The defect edges were debeveled with a #15 scalpel blade.  Given the location of the defect and the proximity to free margins a Banner transposition flap was deemed most appropriate.  Using a sterile surgical marker, an appropriate flap drawn around the defect. The area thus outlined was incised deep to adipose tissue with a #15 scalpel blade.  The skin margins were undermined to an appropriate distance in all directions utilizing iris scissors.

## 2021-11-15 NOTE — OB RN TRIAGE NOTE - NS_OBGYNHISTORY_OBGYN_ALL_OB_FT
x2 FT (2017, ); misc x1 () at 16 weeks with D&C; PP hemorrhage with 2017 pregnancy and misc x1 in 2020

## 2021-11-15 NOTE — OB PROVIDER TRIAGE NOTE - NSOBPROVIDERNOTE_OBGYN_ALL_OB_FT
A/P:  36y  @38wks and 0 days gestation presenting with contractions. Patient not in labor but found to be tachycardic to 130's. Patient states she usually runs tachycardic and is currently asx. Pt afebrile. +FM  -NST  -EKG  -PO hydration  -If tachycardia resolves, pt to be d/c home with labor precautions  D/w Dr. Jen GAUTAMC

## 2021-11-24 ENCOUNTER — INPATIENT (INPATIENT)
Facility: HOSPITAL | Age: 36
LOS: 0 days | Discharge: HOME | End: 2021-11-25
Attending: OBSTETRICS & GYNECOLOGY | Admitting: OBSTETRICS & GYNECOLOGY
Payer: COMMERCIAL

## 2021-11-24 DIAGNOSIS — O26.899 OTHER SPECIFIED PREGNANCY RELATED CONDITIONS, UNSPECIFIED TRIMESTER: ICD-10-CM

## 2021-11-24 DIAGNOSIS — Z98.890 OTHER SPECIFIED POSTPROCEDURAL STATES: Chronic | ICD-10-CM

## 2021-11-24 DIAGNOSIS — Z90.49 ACQUIRED ABSENCE OF OTHER SPECIFIED PARTS OF DIGESTIVE TRACT: Chronic | ICD-10-CM

## 2021-11-24 DIAGNOSIS — R00.0 TACHYCARDIA, UNSPECIFIED: ICD-10-CM

## 2021-11-24 DIAGNOSIS — Z3A.00 WEEKS OF GESTATION OF PREGNANCY NOT SPECIFIED: ICD-10-CM

## 2021-11-24 DIAGNOSIS — Z34.80 ENCOUNTER FOR SUPERVISION OF OTHER NORMAL PREGNANCY, UNSPECIFIED TRIMESTER: ICD-10-CM

## 2021-11-24 DIAGNOSIS — O42.90 PREMATURE RUPTURE OF MEMBRANES, UNSPECIFIED AS TO LENGTH OF TIME BETWEEN RUPTURE AND ONSET OF LABOR, UNSPECIFIED WEEKS OF GESTATION: ICD-10-CM

## 2021-11-24 LAB
APTT BLD: 25.2 SEC — LOW (ref 27.5–35.5)
BASOPHILS # BLD AUTO: 0.02 K/UL — SIGNIFICANT CHANGE UP (ref 0–0.2)
BASOPHILS NFR BLD AUTO: 0.2 % — SIGNIFICANT CHANGE UP (ref 0–2)
BLD GP AB SCN SERPL QL: NEGATIVE — SIGNIFICANT CHANGE UP
COVID-19 SPIKE DOMAIN AB INTERP: POSITIVE
COVID-19 SPIKE DOMAIN ANTIBODY RESULT: >250 U/ML — HIGH
EOSINOPHIL # BLD AUTO: 0.03 K/UL — SIGNIFICANT CHANGE UP (ref 0–0.5)
EOSINOPHIL NFR BLD AUTO: 0.3 % — SIGNIFICANT CHANGE UP (ref 0–6)
FIBRINOGEN PPP-MCNC: 587 MG/DL — HIGH (ref 290–520)
HCT VFR BLD CALC: 33.8 % — LOW (ref 34.5–45)
HGB BLD-MCNC: 11.7 G/DL — SIGNIFICANT CHANGE UP (ref 11.5–15.5)
IMM GRANULOCYTES NFR BLD AUTO: 1.2 % — SIGNIFICANT CHANGE UP (ref 0–1.5)
INR BLD: 1.02 RATIO — SIGNIFICANT CHANGE UP (ref 0.88–1.16)
LYMPHOCYTES # BLD AUTO: 1.43 K/UL — SIGNIFICANT CHANGE UP (ref 1–3.3)
LYMPHOCYTES # BLD AUTO: 13.3 % — SIGNIFICANT CHANGE UP (ref 13–44)
MCHC RBC-ENTMCNC: 30.7 PG — SIGNIFICANT CHANGE UP (ref 27–34)
MCHC RBC-ENTMCNC: 34.6 GM/DL — SIGNIFICANT CHANGE UP (ref 32–36)
MCV RBC AUTO: 88.7 FL — SIGNIFICANT CHANGE UP (ref 80–100)
MONOCYTES # BLD AUTO: 0.77 K/UL — SIGNIFICANT CHANGE UP (ref 0–0.9)
MONOCYTES NFR BLD AUTO: 7.2 % — SIGNIFICANT CHANGE UP (ref 2–14)
NEUTROPHILS # BLD AUTO: 8.35 K/UL — HIGH (ref 1.8–7.4)
NEUTROPHILS NFR BLD AUTO: 77.8 % — HIGH (ref 43–77)
NRBC # BLD: 0 /100 WBCS — SIGNIFICANT CHANGE UP (ref 0–0)
PLATELET # BLD AUTO: 169 K/UL — SIGNIFICANT CHANGE UP (ref 150–400)
PROTHROM AB SERPL-ACNC: 12.2 SEC — SIGNIFICANT CHANGE UP (ref 10.6–13.6)
RBC # BLD: 3.81 M/UL — SIGNIFICANT CHANGE UP (ref 3.8–5.2)
RBC # FLD: 13 % — SIGNIFICANT CHANGE UP (ref 10.3–14.5)
RH IG SCN BLD-IMP: POSITIVE — SIGNIFICANT CHANGE UP
SARS-COV-2 IGG+IGM SERPL QL IA: >250 U/ML — HIGH
SARS-COV-2 IGG+IGM SERPL QL IA: POSITIVE
SARS-COV-2 RNA SPEC QL NAA+PROBE: SIGNIFICANT CHANGE UP
T PALLIDUM AB TITR SER: NEGATIVE — SIGNIFICANT CHANGE UP
WBC # BLD: 10.73 K/UL — HIGH (ref 3.8–10.5)
WBC # FLD AUTO: 10.73 K/UL — HIGH (ref 3.8–10.5)

## 2021-11-24 RX ORDER — SODIUM CHLORIDE 9 MG/ML
3 INJECTION INTRAMUSCULAR; INTRAVENOUS; SUBCUTANEOUS EVERY 8 HOURS
Refills: 0 | Status: DISCONTINUED | OUTPATIENT
Start: 2021-11-24 | End: 2021-11-25

## 2021-11-24 RX ORDER — DIPHENHYDRAMINE HCL 50 MG
25 CAPSULE ORAL EVERY 6 HOURS
Refills: 0 | Status: DISCONTINUED | OUTPATIENT
Start: 2021-11-24 | End: 2021-11-25

## 2021-11-24 RX ORDER — TETANUS TOXOID, REDUCED DIPHTHERIA TOXOID AND ACELLULAR PERTUSSIS VACCINE, ADSORBED 5; 2.5; 8; 8; 2.5 [IU]/.5ML; [IU]/.5ML; UG/.5ML; UG/.5ML; UG/.5ML
0.5 SUSPENSION INTRAMUSCULAR ONCE
Refills: 0 | Status: DISCONTINUED | OUTPATIENT
Start: 2021-11-24 | End: 2021-11-25

## 2021-11-24 RX ORDER — PRAMOXINE HYDROCHLORIDE 150 MG/15G
1 AEROSOL, FOAM RECTAL EVERY 4 HOURS
Refills: 0 | Status: DISCONTINUED | OUTPATIENT
Start: 2021-11-24 | End: 2021-11-25

## 2021-11-24 RX ORDER — CITRIC ACID/SODIUM CITRATE 300-500 MG
15 SOLUTION, ORAL ORAL EVERY 6 HOURS
Refills: 0 | Status: DISCONTINUED | OUTPATIENT
Start: 2021-11-24 | End: 2021-11-24

## 2021-11-24 RX ORDER — BENZOCAINE 10 %
1 GEL (GRAM) MUCOUS MEMBRANE EVERY 6 HOURS
Refills: 0 | Status: DISCONTINUED | OUTPATIENT
Start: 2021-11-24 | End: 2021-11-25

## 2021-11-24 RX ORDER — DIBUCAINE 1 %
1 OINTMENT (GRAM) RECTAL EVERY 6 HOURS
Refills: 0 | Status: DISCONTINUED | OUTPATIENT
Start: 2021-11-24 | End: 2021-11-25

## 2021-11-24 RX ORDER — SODIUM CHLORIDE 9 MG/ML
1000 INJECTION, SOLUTION INTRAVENOUS
Refills: 0 | Status: DISCONTINUED | OUTPATIENT
Start: 2021-11-24 | End: 2021-11-25

## 2021-11-24 RX ORDER — KETOROLAC TROMETHAMINE 30 MG/ML
30 SYRINGE (ML) INJECTION ONCE
Refills: 0 | Status: DISCONTINUED | OUTPATIENT
Start: 2021-11-24 | End: 2021-11-24

## 2021-11-24 RX ORDER — AER TRAVELER 0.5 G/1
1 SOLUTION RECTAL; TOPICAL EVERY 4 HOURS
Refills: 0 | Status: DISCONTINUED | OUTPATIENT
Start: 2021-11-24 | End: 2021-11-25

## 2021-11-24 RX ORDER — OXYTOCIN 10 UNIT/ML
333.33 VIAL (ML) INJECTION
Qty: 20 | Refills: 0 | Status: DISCONTINUED | OUTPATIENT
Start: 2021-11-24 | End: 2021-11-25

## 2021-11-24 RX ORDER — IBUPROFEN 200 MG
600 TABLET ORAL EVERY 6 HOURS
Refills: 0 | Status: DISCONTINUED | OUTPATIENT
Start: 2021-11-24 | End: 2021-11-25

## 2021-11-24 RX ORDER — HYDROCORTISONE 1 %
1 OINTMENT (GRAM) TOPICAL EVERY 6 HOURS
Refills: 0 | Status: DISCONTINUED | OUTPATIENT
Start: 2021-11-24 | End: 2021-11-25

## 2021-11-24 RX ORDER — LANOLIN
1 OINTMENT (GRAM) TOPICAL EVERY 6 HOURS
Refills: 0 | Status: DISCONTINUED | OUTPATIENT
Start: 2021-11-24 | End: 2021-11-25

## 2021-11-24 RX ORDER — SODIUM CHLORIDE 9 MG/ML
500 INJECTION, SOLUTION INTRAVENOUS ONCE
Refills: 0 | Status: DISCONTINUED | OUTPATIENT
Start: 2021-11-24 | End: 2021-11-25

## 2021-11-24 RX ORDER — OXYCODONE HYDROCHLORIDE 5 MG/1
5 TABLET ORAL
Refills: 0 | Status: DISCONTINUED | OUTPATIENT
Start: 2021-11-24 | End: 2021-11-25

## 2021-11-24 RX ORDER — SODIUM CHLORIDE 9 MG/ML
1000 INJECTION, SOLUTION INTRAVENOUS
Refills: 0 | Status: DISCONTINUED | OUTPATIENT
Start: 2021-11-24 | End: 2021-11-24

## 2021-11-24 RX ORDER — SIMETHICONE 80 MG/1
80 TABLET, CHEWABLE ORAL EVERY 4 HOURS
Refills: 0 | Status: DISCONTINUED | OUTPATIENT
Start: 2021-11-24 | End: 2021-11-25

## 2021-11-24 RX ORDER — OXYTOCIN 10 UNIT/ML
4 VIAL (ML) INJECTION
Qty: 30 | Refills: 0 | Status: DISCONTINUED | OUTPATIENT
Start: 2021-11-24 | End: 2021-11-24

## 2021-11-24 RX ORDER — OXYCODONE HYDROCHLORIDE 5 MG/1
5 TABLET ORAL ONCE
Refills: 0 | Status: DISCONTINUED | OUTPATIENT
Start: 2021-11-24 | End: 2021-11-25

## 2021-11-24 RX ORDER — MAGNESIUM HYDROXIDE 400 MG/1
30 TABLET, CHEWABLE ORAL
Refills: 0 | Status: DISCONTINUED | OUTPATIENT
Start: 2021-11-24 | End: 2021-11-25

## 2021-11-24 RX ORDER — ACETAMINOPHEN 500 MG
975 TABLET ORAL
Refills: 0 | Status: DISCONTINUED | OUTPATIENT
Start: 2021-11-24 | End: 2021-11-25

## 2021-11-24 RX ORDER — TRANEXAMIC ACID 100 MG/ML
1000 INJECTION, SOLUTION INTRAVENOUS ONCE
Refills: 0 | Status: COMPLETED | OUTPATIENT
Start: 2021-11-24 | End: 2021-11-24

## 2021-11-24 RX ORDER — IBUPROFEN 200 MG
600 TABLET ORAL EVERY 6 HOURS
Refills: 0 | Status: COMPLETED | OUTPATIENT
Start: 2021-11-24 | End: 2022-10-23

## 2021-11-24 RX ADMIN — Medication 0.2 MILLIGRAM(S): at 13:29

## 2021-11-24 RX ADMIN — Medication 975 MILLIGRAM(S): at 18:13

## 2021-11-24 RX ADMIN — Medication 600 MILLIGRAM(S): at 21:32

## 2021-11-24 RX ADMIN — Medication 600 MILLIGRAM(S): at 22:15

## 2021-11-24 RX ADMIN — Medication 4 MILLIUNIT(S)/MIN: at 09:08

## 2021-11-24 RX ADMIN — SODIUM CHLORIDE 3 MILLILITER(S): 9 INJECTION INTRAMUSCULAR; INTRAVENOUS; SUBCUTANEOUS at 22:43

## 2021-11-24 RX ADMIN — Medication 30 MILLIGRAM(S): at 15:49

## 2021-11-24 RX ADMIN — TRANEXAMIC ACID 220 MILLIGRAM(S): 100 INJECTION, SOLUTION INTRAVENOUS at 13:09

## 2021-11-24 NOTE — OB RN TRIAGE NOTE - NS_TRIAGEADDITIONAL COMMENTS_OBGYN_ALL_OB_FT
pt here a few weeks ago; had elevated pulse ; pt received IVH and EKG and pulse was atill elevated but over 100 pt. here a few weeks ago; had elevated pulse; pt received IVH and EKG and pulse was still elevated but over 100

## 2021-11-24 NOTE — OB PROVIDER H&P - HISTORY OF PRESENT ILLNESS
35 y/o  PHIL 21 @ 39.2 wks ga presenting with gush of clear fluid at 5am which has continued to saturate pads since then. +FM. +very mild cramping since. No vaginal bleeding. Last VE was 2.5/60%. GBS neg.  EFW 6lb8oz at 35 weeks.      Pt was seen last week in triage for false labor and noted to be tachycardic. EKG was sinus tachy and pt's pulse decreased to 100's-110's with fluid bolus. She has no symptoms and no h/o arrhythmias.      all: amox - vomit       bactrim - leg numbness      sulfa - leg numbness    meds: pnv            Diclegis    pmhx: Anxiety (after 16 week loss, pt reports symptoms resolved)  ob: '17 - FT  9lb c/b manual removal  - no blood transfusion        '19- FT  9lb 3 oz uncomplicated          '20- 16 wk SAB (delivered fetus at home) w/ hemorrhage requiring D&C and 3 units PRBC  gyn: denies  surg: '11 - lsc appendectomy           h/o wisdom teeth extraction  fhX: denies  soc: denies x  3

## 2021-11-24 NOTE — OB RN TRIAGE NOTE - TEMPERATURE IN FAHRENHEIT (DEGREES F)
“You can access the FollowHealth Patient Portal, offered by [facility], by registering with the following website: [email or web address]”
98.2

## 2021-11-24 NOTE — OB RN PATIENT PROFILE - NSICDXPASTMEDICALHX_GEN_ALL_CORE_FT
PAST MEDICAL HISTORY:  2019 novel coronavirus disease (COVID-19)     Hemorrhage after vaginal delivery     Hyperemesis gravidarum     Spontaneous vaginal delivery

## 2021-11-24 NOTE — OB RN DELIVERY SUMMARY - NSSELHIDDEN_OBGYN_ALL_OB_FT
[NS_DeliveryAttending1_OBGYN_ALL_OB_FT:[id number]],[NS_DeliveryRN_OBGYN_ALL_OB_FT:[email or web address]],[NS_CirculateRN2_OBGYN_ALL_OB_FT:[id number]]

## 2021-11-24 NOTE — OB PROVIDER IHI INDUCTION/AUGMENTATION NOTE - NS_OBIHIREPANPSATTEST_OBGYN_ALL_OB
To get better and follow your care plan as instructed.
I have discussed with the Attending the patient's status, as well as the H&P and the fetal status. The Attending agreed with the suggested management.

## 2021-11-24 NOTE — OB PROVIDER H&P - NSANTENATALSTERA_OBGYN_ALL_OB
11 y/o with hx asthma/allergies, presents with right knee pain. Wednesday had basketball practice and then reinjured it friday when he landed on his right knee and the knee buckled. Yesterday family noted the right knee was swollen, and was warmer than the other knee. Motrin given in ED.   ROS - fever, hx knee injuries, URI, v/d Not applicable as gestational age is greater than or equal to 34 weeks.

## 2021-11-24 NOTE — OB PROVIDER H&P - NSHPPHYSICALEXAM_GEN_ALL_CORE
ICU Vital Signs Last 24 Hrs  T(C): 36.7 (24 Nov 2021 07:45), Max: 36.8 (24 Nov 2021 06:38)  T(F): 98.1 (24 Nov 2021 07:45), Max: 98.24 (24 Nov 2021 06:38)  HR: 114 (24 Nov 2021 07:45) (109 - 142)  BP: 119/75 (24 Nov 2021 07:45) (119/75 - 129/75)  RR: 18 (24 Nov 2021 07:45) (18 - 18)  SpO2: 97% (24 Nov 2021 07:45) (96% - 99%)  gen: NAD, A+Ox3  cards: clear S1S2, RRR, tachycardia ~ 100   pulm: CTA b/l  abd: soft, gravid. nontender  ve: 3/60/-3 midpost, grossly ROM clear fluid, nitrazine positive

## 2021-11-24 NOTE — OB RN TRIAGE NOTE - NSICDXPASTMEDICALHX_GEN_ALL_CORE_FT
PAST MEDICAL HISTORY:  Hemorrhage after vaginal delivery     Hyperemesis gravidarum     Spontaneous vaginal delivery      PAST MEDICAL HISTORY:  2019 novel coronavirus disease (COVID-19)     Hemorrhage after vaginal delivery     Hyperemesis gravidarum     Spontaneous vaginal delivery

## 2021-11-24 NOTE — OB PROVIDER H&P - PROBLEM SELECTOR PLAN 1
Admit  Routine labs  IV access and second large bore IV given h/o hemorrhage  2 units PRBC on hold  Cont efm/toco  Bicitra, pecpid  Clear diet   Expectant .  Consider TXA and prophylactic medications at delivery.  will d/w Dr. Li re: pitocin augmentation.

## 2021-11-24 NOTE — CHART NOTE - NSCHARTNOTEFT_GEN_A_CORE
OB PA Addendum    Called to evaluate patient secondary to persistent maternal tachycardia  Pt denies SOB, CP or Palpitations  Pain well controlled  Lochia wnl  Pt feels comfortable, currently breastfeeding in bed    T(C): 36.8 (21 @ 15:36), Max: 36.8 (21 @ 06:38)  HR: 101 (21 @ 17:12) (91 - 156)  BP: 112/72 (21 @ 15:36) (85/50 - 129/75)  RR: 18 (21 @ 11:05) (18 - 18)  SpO2: 97% (21 @ 17:12) (89% - 100%)    EKG: normal sinus rhythm     s/p , , asymptomatic  - IVF bolus given  - tachycardia improved  - Discussed with Dr Li, will transfer patient to Postpartum floor and if tachycardia recurrs will consider outpatient cardiology follow up  Shruthi Mclain PA-C

## 2021-11-24 NOTE — OB RN DELIVERY SUMMARY - NS_SEPSISRSKCALC_OBGYN_ALL_OB_FT
EOS calculated successfully. EOS Risk Factor: 0.5/1000 live births ([facility] national incidence); GA=39w2d; Temp=98.24; ROM=8.167; GBS='Negative'; Antibiotics='No antibiotics or any antibiotics < 2 hrs prior to birth'

## 2021-11-24 NOTE — OB RN TRIAGE NOTE - NS_OBGYNHISTORY_OBGYN_ALL_OB_FT
hyperemesis( on diglegis)   x2  mis x1 w d/c received 2 units  PPH first baby ( no blood transfusion) hyperemesis( on diglegis)   x2  mis x1 w d/c received 2 units  PPH first baby ( no blood transfusion)   covid In july hyperemesis( on diclegis)   x2  mis x1 w d/c received 2 units  PPH first baby ( no blood transfusion)   covid In july

## 2021-11-24 NOTE — OB RN PATIENT PROFILE - NS_OBGYNHISTORY_OBGYN_ALL_OB_FT
hyperemesis( on diclegis)   x2  mis x1 w d/c received 2 units  PPH first baby ( no blood transfusion)   covid In july

## 2021-11-25 ENCOUNTER — TRANSCRIPTION ENCOUNTER (OUTPATIENT)
Age: 36
End: 2021-11-25

## 2021-11-25 VITALS
OXYGEN SATURATION: 96 % | HEART RATE: 83 BPM | RESPIRATION RATE: 18 BRPM | SYSTOLIC BLOOD PRESSURE: 104 MMHG | DIASTOLIC BLOOD PRESSURE: 68 MMHG | TEMPERATURE: 98 F

## 2021-11-25 LAB
HCT VFR BLD CALC: 31 % — LOW (ref 34.5–45)
HGB BLD-MCNC: 10.3 G/DL — LOW (ref 11.5–15.5)
MCHC RBC-ENTMCNC: 30.6 PG — SIGNIFICANT CHANGE UP (ref 27–34)
MCHC RBC-ENTMCNC: 33.2 GM/DL — SIGNIFICANT CHANGE UP (ref 32–36)
MCV RBC AUTO: 92 FL — SIGNIFICANT CHANGE UP (ref 80–100)
NRBC # BLD: 0 /100 WBCS — SIGNIFICANT CHANGE UP (ref 0–0)
PLATELET # BLD AUTO: 182 K/UL — SIGNIFICANT CHANGE UP (ref 150–400)
RBC # BLD: 3.37 M/UL — LOW (ref 3.8–5.2)
RBC # FLD: 12.9 % — SIGNIFICANT CHANGE UP (ref 10.3–14.5)
WBC # BLD: 12.54 K/UL — HIGH (ref 3.8–10.5)
WBC # FLD AUTO: 12.54 K/UL — HIGH (ref 3.8–10.5)

## 2021-11-25 PROCEDURE — G0463: CPT

## 2021-11-25 PROCEDURE — 86901 BLOOD TYPING SEROLOGIC RH(D): CPT

## 2021-11-25 PROCEDURE — 85730 THROMBOPLASTIN TIME PARTIAL: CPT

## 2021-11-25 PROCEDURE — 59025 FETAL NON-STRESS TEST: CPT

## 2021-11-25 PROCEDURE — 59050 FETAL MONITOR W/REPORT: CPT

## 2021-11-25 PROCEDURE — 87635 SARS-COV-2 COVID-19 AMP PRB: CPT

## 2021-11-25 PROCEDURE — 85027 COMPLETE CBC AUTOMATED: CPT

## 2021-11-25 PROCEDURE — 86780 TREPONEMA PALLIDUM: CPT

## 2021-11-25 PROCEDURE — 86769 SARS-COV-2 COVID-19 ANTIBODY: CPT

## 2021-11-25 PROCEDURE — 36415 COLL VENOUS BLD VENIPUNCTURE: CPT

## 2021-11-25 PROCEDURE — 85384 FIBRINOGEN ACTIVITY: CPT

## 2021-11-25 PROCEDURE — 85025 COMPLETE CBC W/AUTO DIFF WBC: CPT

## 2021-11-25 PROCEDURE — 86850 RBC ANTIBODY SCREEN: CPT

## 2021-11-25 PROCEDURE — 85610 PROTHROMBIN TIME: CPT

## 2021-11-25 PROCEDURE — 86900 BLOOD TYPING SEROLOGIC ABO: CPT

## 2021-11-25 RX ORDER — MIRTAZAPINE 45 MG/1
7.5 TABLET, ORALLY DISINTEGRATING ORAL
Qty: 0 | Refills: 0 | DISCHARGE

## 2021-11-25 RX ORDER — IBUPROFEN 200 MG
1 TABLET ORAL
Qty: 0 | Refills: 0 | DISCHARGE

## 2021-11-25 RX ORDER — ACETAMINOPHEN 500 MG
2 TABLET ORAL
Qty: 0 | Refills: 0 | DISCHARGE

## 2021-11-25 RX ADMIN — Medication 975 MILLIGRAM(S): at 05:56

## 2021-11-25 RX ADMIN — Medication 600 MILLIGRAM(S): at 03:50

## 2021-11-25 RX ADMIN — Medication 975 MILLIGRAM(S): at 06:30

## 2021-11-25 RX ADMIN — Medication 1 TABLET(S): at 12:46

## 2021-11-25 RX ADMIN — Medication 975 MILLIGRAM(S): at 00:49

## 2021-11-25 RX ADMIN — Medication 600 MILLIGRAM(S): at 10:00

## 2021-11-25 RX ADMIN — Medication 600 MILLIGRAM(S): at 14:53

## 2021-11-25 RX ADMIN — Medication 600 MILLIGRAM(S): at 09:09

## 2021-11-25 RX ADMIN — Medication 975 MILLIGRAM(S): at 00:09

## 2021-11-25 RX ADMIN — Medication 600 MILLIGRAM(S): at 02:58

## 2021-11-25 RX ADMIN — Medication 975 MILLIGRAM(S): at 12:47

## 2021-11-25 NOTE — PROGRESS NOTE ADULT - SUBJECTIVE AND OBJECTIVE BOX
Postpartum Note- PPD#1    Prenatal Labs  Blood type: A Positive  Rubella IgG: imm  RPR: Negative      S:Patient w/o complaints, pain is controlled.    Pt is OOB, tolerating PO, voiding. Vaginal bleeding mild to moderate. Patient is breastfeeding. Denies dizziness, CP, SOB, n/v, abdominal pain or calf pain. Pt was noted to have intrapartum and postpartum tachycardia which appears to have resolved. EKG () revealed normal sinus rhythm.     pmhx/surg: h/o  x 2,  16 week loss w/ PPH needing blood transfusion,  h/o lsc appy     O:  Vital Signs Last 24 Hrs  T(C): 36.7 (2021 05:24), Max: 36.8 (2021 13:33)  T(F): 98.1 (2021 05:24), Max: 98.3 (2021 17:45)  HR: 83 (2021 05:24) (71 - 156)  BP: 104/68 (2021 05:24) (85/50 - 127/68)  BP(mean): --  RR: 18 (2021 05:24) (18 - 18)  SpO2: 96% (2021 05:24) (89% - 100%)     Gen: NAD  Abdomen: Soft, nontender, non-distended, fundus firm.  Vaginal: Lochia WNL.   Ext: Neg edema, Neg calf tenderness    LABS:    Hemoglobin: 10.3 g/dL ( @ 06:21)  Hemoglobin: 11.7 g/dL ( @ 08:17)      Hematocrit: 31.0 % ( @ 06:21)  Hematocrit: 33.8 % ( @ 08:17)

## 2021-11-25 NOTE — DISCHARGE NOTE OB - HOSPITAL COURSE
Pt had an uncomplicated  (please see delivery note for details). Pt had an uncomplicated postpartum course. She was discharged on PPD#1 after meeting all postpartum milestones - ambulating, tolerating PO, voiding. Pt will follow up in the office in 6 weeks for her postpartum check or earlier as needed.

## 2021-11-25 NOTE — DISCHARGE NOTE OB - PLAN OF CARE
Take tylenol and motrin as directed, alternating every 3 hours.   Continue iron and prenatal vitamin daily. Take colace and mylicon as needed for constipation and gas pain.   Nothing in the vagina and no exercise until 6 week visit. You may continue bleeding for several weeks.  Follow up in the office in 6 weeks for full postpartum visit.   Call the office for appointment confirmation and with any concerns, including breast infection, wound infection, postpartum depression, high blood pressure, and painful calves.

## 2021-11-25 NOTE — DISCHARGE NOTE OB - PATIENT PORTAL LINK FT
You can access the FollowMyHealth Patient Portal offered by [facility] by registering at the following website: [email or web address]. By joining [facility]’s FollowMyHealth portal, you will also be able to view your health information using other applications (apps) compatible with our system.

## 2021-11-25 NOTE — PROGRESS NOTE ADULT - PROBLEM SELECTOR PLAN 2
HR in the 80's at this time.   H/H normal.   No obvious etiology of tachycardia from admission. Pt should follow up with outpatient cardiologist or PCP.

## 2021-11-25 NOTE — DISCHARGE NOTE OB - CARE PROVIDER_API CALL
Pascale Duong  RESIDENCY - OBSTETRIC-GYN  [address] [place] [address]  Phone: [phone]  Fax: [phone]  Follow Up Time:

## 2021-11-25 NOTE — DISCHARGE NOTE OB - CARE PLAN
Principal Discharge DX:	Vaginal delivery  Assessment and plan of treatment:	Take tylenol and motrin as directed, alternating every 3 hours.   Continue iron and prenatal vitamin daily. Take colace and mylicon as needed for constipation and gas pain.   Nothing in the vagina and no exercise until 6 week visit. You may continue bleeding for several weeks.  Follow up in the office in 6 weeks for full postpartum visit.   Call the office for appointment confirmation and with any concerns, including breast infection, wound infection, postpartum depression, high blood pressure, and painful calves.   1

## 2021-11-25 NOTE — DISCHARGE NOTE OB - MEDICATION SUMMARY - MEDICATIONS TO TAKE
I will START or STAY ON the medications listed below when I get home from the hospital:    Tylenol 325 mg oral tablet  -- 2 tab(s) by mouth every 6 hours, As Needed  -- Indication: For Pain med     Motrin 600 mg oral tablet  -- 1 tab(s) by mouth every 6 hours, As Needed  -- Indication: For Pain med

## 2021-11-28 ENCOUNTER — EMERGENCY (EMERGENCY)
Facility: HOSPITAL | Age: 36
LOS: 1 days | Discharge: HOME | End: 2021-11-28
Attending: EMERGENCY MEDICINE
Payer: COMMERCIAL

## 2021-11-28 VITALS
RESPIRATION RATE: 16 BRPM | TEMPERATURE: 98 F | DIASTOLIC BLOOD PRESSURE: 80 MMHG | SYSTOLIC BLOOD PRESSURE: 138 MMHG | HEART RATE: 72 BPM | OXYGEN SATURATION: 98 % | WEIGHT: 149.91 LBS | HEIGHT: 67 IN

## 2021-11-28 VITALS
DIASTOLIC BLOOD PRESSURE: 80 MMHG | HEART RATE: 62 BPM | SYSTOLIC BLOOD PRESSURE: 136 MMHG | OXYGEN SATURATION: 98 % | RESPIRATION RATE: 18 BRPM

## 2021-11-28 DIAGNOSIS — Z98.890 OTHER SPECIFIED POSTPROCEDURAL STATES: Chronic | ICD-10-CM

## 2021-11-28 DIAGNOSIS — Z90.49 ACQUIRED ABSENCE OF OTHER SPECIFIED PARTS OF DIGESTIVE TRACT: Chronic | ICD-10-CM

## 2021-11-28 LAB
ALBUMIN SERPL ELPH-MCNC: 3.7 G/DL — SIGNIFICANT CHANGE UP (ref 3.3–5)
ALP SERPL-CCNC: 116 U/L — SIGNIFICANT CHANGE UP (ref 40–120)
ALT FLD-CCNC: 45 U/L — SIGNIFICANT CHANGE UP (ref 10–45)
ANION GAP SERPL CALC-SCNC: 14 MMOL/L — SIGNIFICANT CHANGE UP (ref 5–17)
APTT BLD: 28.3 SEC — SIGNIFICANT CHANGE UP (ref 27.5–35.5)
AST SERPL-CCNC: 30 U/L — SIGNIFICANT CHANGE UP (ref 10–40)
BASOPHILS # BLD AUTO: 0.03 K/UL — SIGNIFICANT CHANGE UP (ref 0–0.2)
BASOPHILS NFR BLD AUTO: 0.3 % — SIGNIFICANT CHANGE UP (ref 0–2)
BILIRUB SERPL-MCNC: 0.2 MG/DL — SIGNIFICANT CHANGE UP (ref 0.2–1.2)
BUN SERPL-MCNC: 6 MG/DL — LOW (ref 7–23)
CALCIUM SERPL-MCNC: 8.7 MG/DL — SIGNIFICANT CHANGE UP (ref 8.4–10.5)
CHLORIDE SERPL-SCNC: 107 MMOL/L — SIGNIFICANT CHANGE UP (ref 96–108)
CO2 SERPL-SCNC: 21 MMOL/L — LOW (ref 22–31)
CREAT SERPL-MCNC: 0.57 MG/DL — SIGNIFICANT CHANGE UP (ref 0.5–1.3)
EOSINOPHIL # BLD AUTO: 0.02 K/UL — SIGNIFICANT CHANGE UP (ref 0–0.5)
EOSINOPHIL NFR BLD AUTO: 0.2 % — SIGNIFICANT CHANGE UP (ref 0–6)
FIBRINOGEN PPP-MCNC: 591 MG/DL — HIGH (ref 290–520)
GLUCOSE SERPL-MCNC: 96 MG/DL — SIGNIFICANT CHANGE UP (ref 70–99)
HCT VFR BLD CALC: 33.9 % — LOW (ref 34.5–45)
HGB BLD-MCNC: 11.3 G/DL — LOW (ref 11.5–15.5)
IMM GRANULOCYTES NFR BLD AUTO: 1.1 % — SIGNIFICANT CHANGE UP (ref 0–1.5)
INR BLD: 0.99 RATIO — SIGNIFICANT CHANGE UP (ref 0.88–1.16)
LDH SERPL L TO P-CCNC: 326 U/L — HIGH (ref 50–242)
LYMPHOCYTES # BLD AUTO: 1.43 K/UL — SIGNIFICANT CHANGE UP (ref 1–3.3)
LYMPHOCYTES # BLD AUTO: 15 % — SIGNIFICANT CHANGE UP (ref 13–44)
MCHC RBC-ENTMCNC: 30.1 PG — SIGNIFICANT CHANGE UP (ref 27–34)
MCHC RBC-ENTMCNC: 33.3 GM/DL — SIGNIFICANT CHANGE UP (ref 32–36)
MCV RBC AUTO: 90.4 FL — SIGNIFICANT CHANGE UP (ref 80–100)
MONOCYTES # BLD AUTO: 0.66 K/UL — SIGNIFICANT CHANGE UP (ref 0–0.9)
MONOCYTES NFR BLD AUTO: 6.9 % — SIGNIFICANT CHANGE UP (ref 2–14)
NEUTROPHILS # BLD AUTO: 7.27 K/UL — SIGNIFICANT CHANGE UP (ref 1.8–7.4)
NEUTROPHILS NFR BLD AUTO: 76.5 % — SIGNIFICANT CHANGE UP (ref 43–77)
NRBC # BLD: 0 /100 WBCS — SIGNIFICANT CHANGE UP (ref 0–0)
PLATELET # BLD AUTO: 292 K/UL — SIGNIFICANT CHANGE UP (ref 150–400)
POTASSIUM SERPL-MCNC: 3.9 MMOL/L — SIGNIFICANT CHANGE UP (ref 3.5–5.3)
POTASSIUM SERPL-SCNC: 3.9 MMOL/L — SIGNIFICANT CHANGE UP (ref 3.5–5.3)
PROT SERPL-MCNC: 6.6 G/DL — SIGNIFICANT CHANGE UP (ref 6–8.3)
PROTHROM AB SERPL-ACNC: 11.9 SEC — SIGNIFICANT CHANGE UP (ref 10.6–13.6)
RBC # BLD: 3.75 M/UL — LOW (ref 3.8–5.2)
RBC # FLD: 12.9 % — SIGNIFICANT CHANGE UP (ref 10.3–14.5)
SODIUM SERPL-SCNC: 142 MMOL/L — SIGNIFICANT CHANGE UP (ref 135–145)
URATE SERPL-MCNC: 3.8 MG/DL — SIGNIFICANT CHANGE UP (ref 2.5–7)
WBC # BLD: 9.51 K/UL — SIGNIFICANT CHANGE UP (ref 3.8–10.5)
WBC # FLD AUTO: 9.51 K/UL — SIGNIFICANT CHANGE UP (ref 3.8–10.5)

## 2021-11-28 PROCEDURE — 85730 THROMBOPLASTIN TIME PARTIAL: CPT

## 2021-11-28 PROCEDURE — 85025 COMPLETE CBC W/AUTO DIFF WBC: CPT

## 2021-11-28 PROCEDURE — 83615 LACTATE (LD) (LDH) ENZYME: CPT

## 2021-11-28 PROCEDURE — 84550 ASSAY OF BLOOD/URIC ACID: CPT

## 2021-11-28 PROCEDURE — 99284 EMERGENCY DEPT VISIT MOD MDM: CPT

## 2021-11-28 PROCEDURE — 85384 FIBRINOGEN ACTIVITY: CPT

## 2021-11-28 PROCEDURE — 85610 PROTHROMBIN TIME: CPT

## 2021-11-28 PROCEDURE — 80053 COMPREHEN METABOLIC PANEL: CPT

## 2021-11-28 NOTE — CONSULT NOTE ADULT - ASSESSMENT
37yo  PPD#4 w/ elevated blood pressures at home. No s/s of sPEC. BPs on presentation to ED 120s-130s/80s. Low suspicion for PP PEC.     Recs:   - BP monitoring x2 hrs  - HELLP labs pending     Tiara Rice MD PGY2   d/w Dr. Duong 37yo  PPD#4 w/ elevated blood pressures at home. No s/s of sPEC. BPs on presentation to ED 120s-130s/80s. Low suspicion for PP PEC.     Recs:   - BP monitoring x2 hrs  - HELLP labs pending     Tiara Rice MD PGY2   d/w Dr. Duong    Addendum (8012p)    BPs and labs reviewed. BPs remain 120s-130s/80s since arrival at 752p. HELLP labs wnl.     - d/c to home   - no need for anti-HTN medication at this time   - pt has appointment with Dr. Duong in office javier Rice MD PGY2  d/w Dr. Duong

## 2021-11-28 NOTE — ED PROVIDER NOTE - NSFOLLOWUPINSTRUCTIONS_ED_ALL_ED_FT
Leg Edema    WHAT YOU NEED TO KNOW: Leg edema is swelling caused by fluid buildup. Your legs may swell if you sit or stand for long periods of time, are pregnant, or are injured. Swelling may also occur if you have heart failure or circulation problems. This means that your heart does not pump blood through your body as it should.    DISCHARGE INSTRUCTIONS: Elevate your legs: Raise your legs above the level of your heart as often as you can. This will help decrease swelling and pain. Prop your legs on pillows or blankets to keep them elevated comfortably. Wear pressure stockings: These tight stockings put pressure on your legs to promote blood flow and prevent blood clots. Wear the stockings during the day. Do not wear them while you sleep. Apply heat: Heat helps decrease pain and swelling. Apply heat on the area for 20 to 30 minutes every 2 hours for as many days as directed. Stay active: Do not stand or sit for long periods of time. Ask your healthcare provider about the best exercise plan for you. Eat healthy foods: Healthy foods include fruits, vegetables, whole-grain breads, low-fat dairy products, beans, lean meats, and fish. Ask if you need to be on a special diet. Limit salt. Salt will make your body hold even more fluid. Follow up with your healthcare provider as directed: Write down your questions so you remember to ask them during your visits.     RETURN TO ED IF: You have a fever or feel more tired than usual. The veins in your legs look larger than usual. They may look full or bulging. Your legs itch or feel heavy. You have red or white areas or sores on your legs. The skin may also appear dimpled or have indentations. You are gaining weight. You have trouble moving your ankles. The swelling does not go away, or other parts of your body swell. You have questions or concerns about your condition or care.    RETURN TO ED IF: You cannot walk. You feel faint or confused. Your skin turns blue or gray. Your leg feels warm, tender, and painful. It may be swollen and red. You have chest pain or trouble breathing that is worse when you lie down. You suddenly feel lightheaded and have trouble breathing. You have new and sudden chest pain. You may have more pain when you take deep breaths or cough. You may also cough up blood.

## 2021-11-28 NOTE — ED ADULT NURSE NOTE - CHIEF COMPLAINT QUOTE
4 days post partum with high BP, OB wanted her to come in to be seen. Mild headache and swelling in feet and hands. Currently also being treated for mastitis

## 2021-11-28 NOTE — ED ADULT NURSE NOTE - NSIMPLEMENTINTERV_GEN_ALL_ED
Implemented All Universal Safety Interventions:  [place] to call system. Call bell, personal items and telephone within reach. Instruct patient to call for assistance. Room bathroom lighting operational. Non-slip footwear when patient is off stretcher. Physically safe environment: no spills, clutter or unnecessary equipment. Stretcher in lowest position, wheels locked, appropriate side rails in place.

## 2021-11-28 NOTE — ED PROVIDER NOTE - CHIEF COMPLAINT
The patient is a 36y Female complaining of  The patient is a 36y Female complaining of post partum htn

## 2021-11-28 NOTE — ED PROVIDER NOTE - PATIENT PORTAL LINK FT
You can access the FollowMyHealth Patient Portal offered by [facility] by registering at the following website: [email or web address]. By joining [employer]’s FollowMyHealth portal, you will also be able to view your health information using other applications (apps) compatible with our system.

## 2021-11-28 NOTE — ED ADULT NURSE NOTE - OBJECTIVE STATEMENT
Pt 36 year old femlae, A/O x3. Pt came in due to HTN post partum. PMH- Mastitis of left breast (on erythromycin). Third pregnancy. Pt states she had vaginal birth 4 days ago. Started to develop slight HA and "felt weird" assessed BP at home and systolic was in 130s-140s. Upon assessment, BP 130s systolic, 70s diastolic Pt 36 year old female, A/O x3. Pt came in due to HTN post partum. PMH- Mastitis of left breast (on erythromycin). Third pregnancy. Pt states she had vaginal birth 4 days ago. Started to develop slight HA and "felt weird" assessed BP at home and systolic was in 130s-140s. Upon assessment, BP 130s systolic, 70s diastolic, no HA/ pain/ discomfort at this time. SKin- warm, dry, intact. Abd. soft, nontender, nondistended. Denies SOB, chest pain, fever, chills, N/V/D, numbness/ tingling. Safety and comfort maintained.

## 2021-11-28 NOTE — ED PROVIDER NOTE - OBJECTIVE STATEMENT
36y F w/ no pertinent PMHx presents to the ED c/o mild swelling in feet/hands and mild HA. Reports that due to sx and feeling off took BP and it was higher than normal. Pt called her OB and was told to come to ED. Feeling better but slightly light headed. Was recently taken off of medication during pregnancy. States that she received fluids while delivering and is taking Tylenol/Motrin around the clock. Denies fever, diarrhea, decreased eating/drinking. Also has Mastitis in L breast and was put on ABx last night(Erythromycin). Denies smoking, etoh use, street drug use. Allergic to Amox and Bactrim. 36y F w/ no pertinent PMHx, 4 days post partum presents to the ED c/o mild swelling in feet/hands and mild HA. Reports that due to sx and feeling off took BP and it was higher than normal. Pt called her OB and was told to come to ED. Feeling better but slightly light headed. Was recently taken off of medication during pregnancy. States that she received fluids while delivering and is taking Tylenol/Motrin around the clock. Denies fever, diarrhea, decreased eating/drinking. Also has Mastitis in L breast and was put on ABx last night(Erythromycin). Denies smoking, etoh use, street drug use. Allergic to Amox and Bactrim.

## 2021-11-28 NOTE — CONSULT NOTE ADULT - SUBJECTIVE AND OBJECTIVE BOX
JESSICA JACOBS  36y  Female [id number]    HPI:  37yo  PPD#4 s/p uncomplicated vaginal delivery presenting to ED with elevated blood pressures at home. Patient states that she was feeling tired, lightheaded today but states that she had not had much to eat or drink all day. She also noted increased swelling in her hands and feet. Patient took her blood pressure at home and BP was in the 1405-150s/80s-90s at which time she called her OB and was told to come to the ED for evaluation. Patient denies HA, blurry vision, CP, SOB, RUQ/epigastric pain. Of note, patient on erythromycin for mastitis. Denies fevers/chills or worsening breast pain at this time.       Name of GYN Physician: Rhoda     POB: NSVDx3. SAB x1 at 16wk s/p D&C.   Pgyn: Denies fibroids, cysts, endometriosis, STI's, Abnormal pap smears   PMHX: Denies  PSHx: D&C, Appendectomy ()  Meds: Erythromycin   All: Sulfa drugs, Amoxicillin  Social History:  Denies smoking use, drug use, alcohol use.     Vital Signs Last 24 Hrs  T(C): 36.5 (2021 19:52), Max: 36.5 (2021 19:52)  T(F): 97.7 (2021 19:52), Max: 97.7 (2021 19:52)  HR: 59 (2021 21:45) (59 - 76)  BP: 127/85 (2021 21:45) (127/85 - 138/80)  BP(mean): 98 (2021 21:45) (97 - 102)  RR: 18 (2021 21:45) (16 - 18)  SpO2: 99% (2021 21:45) (98% - 100%)    Physical Exam:   General: sitting comfortably in bed, NAD   HEENT: neck supple, full ROM  CV: RR S1S2 no m/r/g  Abd: Soft, non-tender, non-distended.    : Scant bleeding on pad   Ext: non-tender b/l, trace edema     LABS:                  11.3   9.51  )-----------( 292      ( 2021 21:30 )             33.9         142  |  107  |  6<L>  ----------------------------<  96  3.9   |  21<L>  |  0.57    Ca    8.7      2021 21:30    TPro  6.6  /  Alb  3.7  /  TBili  0.2  /  DBili  x   /  AST  30  /  ALT  45  /  AlkPhos  116      I&O's Detail    PT/INR - ( 2021 21:30 )   PT: 11.9 sec;   INR: 0.99 ratio         PTT - ( 2021 21:30 )  PTT:28.3 sec

## 2021-11-28 NOTE — ED PROVIDER NOTE - CLINICAL SUMMARY MEDICAL DECISION MAKING FREE TEXT BOX
35 yo F 4 days post partum p/w increasing blood pressures ext swelling and mild HA. VSS benign exam. lower concern for pre E likely fluid overload from pregnancy. less likely 2/2 mastitis. Will do labs and get serial bps. 37 yo F 4 days post partum p/w increasing blood pressures ext swelling and mild HA. VSS benign exam. lower concern for pre E likely fluid overload from pregnancy/ hosptilization. less likely 2/2 mastitis. Will do labs and get serial bps.

## 2021-11-28 NOTE — CONSULT NOTE ADULT - ATTENDING COMMENTS
Agree with plan above  BPs wnl, no S&S of PEC on presentation  HELLP labs nl  follow up in office    MD Janes

## 2021-11-29 PROBLEM — O21.0 MILD HYPEREMESIS GRAVIDARUM: Chronic | Status: ACTIVE | Noted: 2021-11-24

## 2021-11-29 PROBLEM — U07.1 COVID-19: Chronic | Status: ACTIVE | Noted: 2021-11-24

## 2021-12-05 ENCOUNTER — NON-APPOINTMENT (OUTPATIENT)
Age: 36
End: 2021-12-05

## 2021-12-05 ENCOUNTER — RESULT CHARGE (OUTPATIENT)
Age: 36
End: 2021-12-05

## 2021-12-06 ENCOUNTER — NON-APPOINTMENT (OUTPATIENT)
Age: 36
End: 2021-12-06

## 2021-12-06 ENCOUNTER — APPOINTMENT (OUTPATIENT)
Dept: CARDIOLOGY | Facility: CLINIC | Age: 36
End: 2021-12-06
Payer: COMMERCIAL

## 2021-12-06 VITALS
OXYGEN SATURATION: 100 % | WEIGHT: 145 LBS | HEART RATE: 83 BPM | SYSTOLIC BLOOD PRESSURE: 132 MMHG | BODY MASS INDEX: 22.76 KG/M2 | DIASTOLIC BLOOD PRESSURE: 88 MMHG | HEIGHT: 67 IN

## 2021-12-06 DIAGNOSIS — O13.9 GESTATIONAL [PREGNANCY-INDUCED] HYPERTENSION W/OUT SIGNIFICANT PROTEINURIA, UNSPECIFIED TRIMESTER: ICD-10-CM

## 2021-12-06 DIAGNOSIS — Z82.49 FAMILY HISTORY OF ISCHEMIC HEART DISEASE AND OTHER DISEASES OF THE CIRCULATORY SYSTEM: ICD-10-CM

## 2021-12-06 DIAGNOSIS — Z00.00 ENCOUNTER FOR GENERAL ADULT MEDICAL EXAMINATION W/OUT ABNORMAL FINDINGS: ICD-10-CM

## 2021-12-06 DIAGNOSIS — Z83.438 FAMILY HISTORY OF OTHER DISORDER OF LIPOPROTEIN METABOLISM AND OTHER LIPIDEMIA: ICD-10-CM

## 2021-12-06 PROCEDURE — 99203 OFFICE O/P NEW LOW 30 MIN: CPT

## 2021-12-06 PROCEDURE — 93000 ELECTROCARDIOGRAM COMPLETE: CPT

## 2021-12-06 RX ORDER — DOXYLAMINE SUCCINATE AND PYRIDOXINE HYDROCHLORIDE 10; 10 MG/1; MG/1
10-10 TABLET, DELAYED RELEASE ORAL
Qty: 40 | Refills: 0 | Status: DISCONTINUED | COMMUNITY
Start: 2021-09-20

## 2021-12-06 RX ORDER — LABETALOL HYDROCHLORIDE 200 MG/1
200 TABLET, FILM COATED ORAL
Qty: 60 | Refills: 0 | Status: DISCONTINUED | COMMUNITY
Start: 2021-11-29

## 2021-12-06 RX ORDER — ERGOCALCIFEROL (VITAMIN D2) 10 MCG
27-0.8 TABLET ORAL DAILY
Qty: 90 | Refills: 3 | Status: ACTIVE | COMMUNITY
Start: 2021-12-06

## 2021-12-06 RX ORDER — IPRATROPIUM BROMIDE 42 UG/1
0.06 SPRAY NASAL
Qty: 15 | Refills: 0 | Status: DISCONTINUED | COMMUNITY
Start: 2021-11-10

## 2021-12-06 RX ORDER — AZITHROMYCIN 250 MG/1
250 TABLET, FILM COATED ORAL
Qty: 6 | Refills: 0 | Status: DISCONTINUED | COMMUNITY
Start: 2021-07-05

## 2021-12-06 RX ORDER — ERYTHROMYCIN 500 MG/1
500 TABLET, FILM COATED ORAL
Qty: 14 | Refills: 0 | Status: DISCONTINUED | COMMUNITY
Start: 2021-11-27

## 2021-12-06 RX ORDER — NIFEDIPINE 30 MG/1
30 TABLET, FILM COATED, EXTENDED RELEASE ORAL
Qty: 30 | Refills: 0 | Status: DISCONTINUED | COMMUNITY
Start: 2021-11-29

## 2021-12-06 NOTE — HISTORY OF PRESENT ILLNESS
[FreeTextEntry1] : 36 year old female that presents [facility] for blood pressure management and a cardiovascular assessment.\par \par Ms. Cunningham carries a strong family history of hypertension and hyperlipidemia. \par Most recently, patient delivered a full term, , baby girl on 2021. 4 days post partum, patient \par developed transient 'blurry vision" and hypertension. She presented to the [place] ER and was treated\par with Procardia ER 30 mg daily and discharged . Two days ago, patient's blood pressure was measure at home as elevated up to 150/90/\par \par Patient contacted her OB. Procardia dosage was increased to 60 mg daily. \par \par Blood pressure today remains mildly elevated at 132/88.\par EKG- sinus rhythm @ 71 bpm \par \par Her prior pregnancy history is as follows:\par \par 1st pregnancy 2017- full term, , girl\par                      hemorrhage-> no transfusion\par \par 2nd pregnancy 2019 full term, , boy\par                      No complications\par \par 3rd pregnancy , pregnancy loss at 16 weeks. spontaneous miscarriage \par                        4 unit blood transfusion and D& C\par \par Recent 4th  pregnancy- as noted above. Currently breast feeding her .\par \par Patient currently reports feeling "well". Denies any issues with shortness of breath, chest discomfort, blurry vision or palpitations.

## 2021-12-06 NOTE — ASSESSMENT
[FreeTextEntry1] : 36 year old female with strong family history of hypertension and hyperlipidemia.\par \par \par Ms. Cunningham delivered a full term, , baby girl on 2021. 4 days post partum, patient \par developed transient 'blurry vision" and hypertension. She presented to the [place] ER and was treated\par with Procardia ER 30 mg daily and discharged . Two days ago, patient's blood pressure spiked at home as  up to 150/90.\par \par Patient contacted her OB. Procardia dosage was increased to 60 mg daily. \par \par #Gestational Hypertension\par   Continue on Procardia ER 60 mg QD\par   Advised patient to take blood pressure at home prior to taking her evening Procardia dose.\par   If BP is below 115 systolic, she has been advised to withhold her medication and contact the office\par   BP log for one week to assess current blood pressure control\par \par #Adverse Cardiovascular Risk Factors\par   Strong family history of HTN/ HLD\par   Personal history of gestational Hypertension\par   Will schedule an echocardiogram to assess cardiac structure\par   Will schedule exercise stress testing to assess cardiac functional status\par   Testing in three months with full blood work panel:\par   CBC, CMP, TSH, Lipid panel, Hgb A1C\par \par \par \par \par

## 2021-12-14 ENCOUNTER — NON-APPOINTMENT (OUTPATIENT)
Age: 36
End: 2021-12-14

## 2021-12-20 RX ORDER — NIFEDIPINE 60 MG/1
60 TABLET, FILM COATED, EXTENDED RELEASE ORAL
Qty: 180 | Refills: 2 | Status: DISCONTINUED | COMMUNITY
Start: 2021-12-06 | End: 2021-12-20

## 2022-01-03 NOTE — OB RN PATIENT PROFILE - PRO HBSAG INFANT
Discussed patient with Dr. Montes and patient's RN Saranya.  Progressive hypoxia related to COVID-19 pneumonia.  Telemetry today looks like sinus tachycardia in the 120-130 range, this is likely physiologic in the setting of severe illness.  We will stop IV amiodarone and start amiodarone 200 mg twice daily.  Not much that can be done for his heart rate in the setting of advanced hypoxic respiratory failure.  Patient's wife is visiting and discussions have been had about a more palliative approach.  We will follow peripherally, please call with questions or concerns   negative

## 2022-01-25 ENCOUNTER — APPOINTMENT (OUTPATIENT)
Dept: CARDIOLOGY | Facility: CLINIC | Age: 37
End: 2022-01-25

## 2022-01-26 ENCOUNTER — APPOINTMENT (OUTPATIENT)
Dept: CARDIOLOGY | Facility: CLINIC | Age: 37
End: 2022-01-26
Payer: COMMERCIAL

## 2022-01-26 PROCEDURE — 99214 OFFICE O/P EST MOD 30 MIN: CPT | Mod: 95

## 2022-01-26 NOTE — ASSESSMENT
[FreeTextEntry1] : 36 year old female with strong family history of hypertension and hyperlipidemia.\par \par \par Ms. Cunningham delivered a full term, , baby girl on 2021. 4 days post partum, patient \par developed transient 'blurry vision" and hypertension. She presented to the [place] ER and was treated\par with Procardia ER 30 mg daily and discharged . Two days ago, patient's blood pressure spiked at home as  up to 150/90.\par \par Patient contacted her OB. Procardia dosage was increased to 60 mg daily. \par \par #Gestational Hypertension\par   Continue on Procardia ER 60 mg QD\par   Advised patient to take blood pressure at home prior to taking her evening Procardia dose.\par   If BP is below 115 systolic, she has been advised to withhold her medication and contact the office\par   BP log for one week to assess current blood pressure control\par \par #Adverse Cardiovascular Risk Factors\par   Strong family history of HTN/ HLD\par   Personal history of gestational Hypertension\par   Will schedule an echocardiogram to assess cardiac structure\par   Will schedule exercise stress testing to assess cardiac functional status\par   Testing in three months with full blood work panel:\par   CBC, CMP, TSH, Lipid panel, Hgb A1C\par \par \par \par \par

## 2022-01-26 NOTE — HISTORY OF PRESENT ILLNESS
[FreeTextEntry1] : Telehealth visit:\par \par 36 year old female that presents [facility] for blood pressure management and a cardiovascular assessment.\par \par Ms. Cunningham carries a strong family history of hypertension and hyperlipidemia. \par Most recently, patient delivered a full term, , baby girl on 2021. 4 days post partum, patient \par developed transient 'blurry vision" and hypertension. She presented to the [place] ER and was treated\par with Procardia ER 30 mg daily and discharged . Two days ago, patient's blood pressure was measure at home as elevated up to 150/90/\par \par Patient contacted her OB. Procardia dosage was increased to 60 mg daily. \par \par Blood pressure today remains mildly elevated at 132/88.\par EKG- sinus rhythm @ 71 bpm \par \par Her prior pregnancy history is as follows:\par \par 1st pregnancy 2017- full term, , girl\par                      hemorrhage-> no transfusion\par \par 2nd pregnancy 2019 full term, , boy\par                      No complications\par \par 3rd pregnancy , pregnancy loss at 16 weeks. spontaneous miscarriage \par                        4 unit blood transfusion and D& C\par \par Recent 4th  pregnancy- as noted above. Currently breast feeding her .\par \par Patient currently reports feeling "well". Denies any issues with shortness of breath, chest discomfort, blurry vision or palpitations.\par \par Interval note:\par 22 - telehealth visit\par \par

## 2022-01-26 NOTE — DISCUSSION/SUMMARY
[FreeTextEntry1] : 36 year old female with strong family history of hypertension and hyperlipidemia.\par \par Ms. Cunningham delivered a full term, , baby girl on 2021. 4 days post partum, patient \par developed transient 'blurry vision" and hypertension. She presented to the [place] ER and was treated with Procardia ER 30 mg daily and discharged. Two days ago, patient's blood pressure spiked at home as  up to 150/90.\par \par Patient contacted her OB. Procardia dosage was increased to 60 mg daily. \par \par #Gestational Hypertension\par   off of procardia since 22 and since then her BP has been stable 117-120/70s\par   Advised patient to take blood pressure at home prior to taking her evening Procardia dose.\par   If BP is below 115 systolic, she has been advised to withhold her medication and contact the office\par   BP log for one week to assess current blood pressure control\par \par #Adverse Cardiovascular Risk Factors\par   Strong family history of HTN/ HLD\par   Personal history of gestational Hypertension\par   Will schedule an echocardiogram to assess cardiac structure\par   Will schedule exercise stress testing to assess cardiac functional status\par   Testing in three months with full blood work panel:\par   CBC, CMP, TSH, Lipid panel, Hgb A1C\par \par \par \par \par

## 2022-02-02 ENCOUNTER — APPOINTMENT (OUTPATIENT)
Dept: CV DIAGNOSTICS | Facility: HOSPITAL | Age: 37
End: 2022-02-02

## 2022-02-10 ENCOUNTER — APPOINTMENT (OUTPATIENT)
Dept: CARDIOLOGY | Facility: CLINIC | Age: 37
End: 2022-02-10

## 2022-02-17 ENCOUNTER — APPOINTMENT (OUTPATIENT)
Dept: CARDIOLOGY | Facility: CLINIC | Age: 37
End: 2022-02-17

## 2022-02-25 ENCOUNTER — TRANSCRIPTION ENCOUNTER (OUTPATIENT)
Age: 37
End: 2022-02-25

## 2022-03-07 ENCOUNTER — APPOINTMENT (OUTPATIENT)
Dept: CARDIOLOGY | Facility: CLINIC | Age: 37
End: 2022-03-07
Payer: COMMERCIAL

## 2022-03-07 ENCOUNTER — EMERGENCY (EMERGENCY)
Facility: HOSPITAL | Age: 37
LOS: 1 days | Discharge: HOME | End: 2022-03-07
Attending: EMERGENCY MEDICINE
Payer: COMMERCIAL

## 2022-03-07 ENCOUNTER — LABORATORY RESULT (OUTPATIENT)
Age: 37
End: 2022-03-07

## 2022-03-07 ENCOUNTER — NON-APPOINTMENT (OUTPATIENT)
Age: 37
End: 2022-03-07

## 2022-03-07 VITALS
OXYGEN SATURATION: 100 % | DIASTOLIC BLOOD PRESSURE: 84 MMHG | WEIGHT: 131 LBS | TEMPERATURE: 97.4 F | HEART RATE: 131 BPM | SYSTOLIC BLOOD PRESSURE: 128 MMHG | HEIGHT: 67 IN | BODY MASS INDEX: 20.56 KG/M2

## 2022-03-07 VITALS
HEART RATE: 80 BPM | OXYGEN SATURATION: 100 % | RESPIRATION RATE: 18 BRPM | DIASTOLIC BLOOD PRESSURE: 81 MMHG | SYSTOLIC BLOOD PRESSURE: 104 MMHG

## 2022-03-07 VITALS
HEIGHT: 67 IN | RESPIRATION RATE: 20 BRPM | SYSTOLIC BLOOD PRESSURE: 137 MMHG | DIASTOLIC BLOOD PRESSURE: 90 MMHG | TEMPERATURE: 98 F | WEIGHT: 130.95 LBS | OXYGEN SATURATION: 99 % | HEART RATE: 144 BPM

## 2022-03-07 VITALS — DIASTOLIC BLOOD PRESSURE: 68 MMHG | SYSTOLIC BLOOD PRESSURE: 100 MMHG

## 2022-03-07 DIAGNOSIS — R00.0 TACHYCARDIA, UNSPECIFIED: ICD-10-CM

## 2022-03-07 DIAGNOSIS — Z90.49 ACQUIRED ABSENCE OF OTHER SPECIFIED PARTS OF DIGESTIVE TRACT: Chronic | ICD-10-CM

## 2022-03-07 DIAGNOSIS — Z98.890 OTHER SPECIFIED POSTPROCEDURAL STATES: Chronic | ICD-10-CM

## 2022-03-07 LAB
ALBUMIN SERPL ELPH-MCNC: 5.2 G/DL — HIGH (ref 3.3–5)
ALP SERPL-CCNC: 101 U/L — SIGNIFICANT CHANGE UP (ref 40–120)
ALT FLD-CCNC: 26 U/L — SIGNIFICANT CHANGE UP (ref 10–45)
ANION GAP SERPL CALC-SCNC: 15 MMOL/L — SIGNIFICANT CHANGE UP (ref 5–17)
APPEARANCE UR: CLEAR — SIGNIFICANT CHANGE UP
APTT BLD: 30.2 SEC — SIGNIFICANT CHANGE UP (ref 27.5–35.5)
AST SERPL-CCNC: 21 U/L — SIGNIFICANT CHANGE UP (ref 10–40)
BACTERIA # UR AUTO: NEGATIVE — SIGNIFICANT CHANGE UP
BASOPHILS # BLD AUTO: 0.03 K/UL — SIGNIFICANT CHANGE UP (ref 0–0.2)
BASOPHILS NFR BLD AUTO: 0.2 % — SIGNIFICANT CHANGE UP (ref 0–2)
BILIRUB SERPL-MCNC: 0.5 MG/DL — SIGNIFICANT CHANGE UP (ref 0.2–1.2)
BILIRUB UR-MCNC: NEGATIVE — SIGNIFICANT CHANGE UP
BUN SERPL-MCNC: 9 MG/DL — SIGNIFICANT CHANGE UP (ref 7–23)
CALCIUM SERPL-MCNC: 10.1 MG/DL — SIGNIFICANT CHANGE UP (ref 8.4–10.5)
CHLORIDE SERPL-SCNC: 103 MMOL/L — SIGNIFICANT CHANGE UP (ref 96–108)
CO2 SERPL-SCNC: 22 MMOL/L — SIGNIFICANT CHANGE UP (ref 22–31)
COLOR SPEC: COLORLESS — SIGNIFICANT CHANGE UP
CREAT SERPL-MCNC: 0.81 MG/DL — SIGNIFICANT CHANGE UP (ref 0.5–1.3)
DIFF PNL FLD: NEGATIVE — SIGNIFICANT CHANGE UP
EGFR: 98 ML/MIN/1.73M2 — SIGNIFICANT CHANGE UP
EOSINOPHIL # BLD AUTO: 0.01 K/UL — SIGNIFICANT CHANGE UP (ref 0–0.5)
EOSINOPHIL NFR BLD AUTO: 0.1 % — SIGNIFICANT CHANGE UP (ref 0–6)
EPI CELLS # UR: 1 /HPF — SIGNIFICANT CHANGE UP
GLUCOSE SERPL-MCNC: 110 MG/DL — HIGH (ref 70–99)
GLUCOSE UR QL: NEGATIVE — SIGNIFICANT CHANGE UP
HCG SERPL-ACNC: <2 MIU/ML — SIGNIFICANT CHANGE UP
HCT VFR BLD CALC: 44 % — SIGNIFICANT CHANGE UP (ref 34.5–45)
HGB BLD-MCNC: 14.5 G/DL — SIGNIFICANT CHANGE UP (ref 11.5–15.5)
HYALINE CASTS # UR AUTO: 0 /LPF — SIGNIFICANT CHANGE UP (ref 0–2)
IMM GRANULOCYTES NFR BLD AUTO: 0.3 % — SIGNIFICANT CHANGE UP (ref 0–1.5)
INR BLD: 1.14 RATIO — SIGNIFICANT CHANGE UP (ref 0.88–1.16)
KETONES UR-MCNC: ABNORMAL
LEUKOCYTE ESTERASE UR-ACNC: NEGATIVE — SIGNIFICANT CHANGE UP
LYMPHOCYTES # BLD AUTO: 1.85 K/UL — SIGNIFICANT CHANGE UP (ref 1–3.3)
LYMPHOCYTES # BLD AUTO: 13.2 % — SIGNIFICANT CHANGE UP (ref 13–44)
MAGNESIUM SERPL-MCNC: 2.1 MG/DL — SIGNIFICANT CHANGE UP (ref 1.6–2.6)
MCHC RBC-ENTMCNC: 28.5 PG — SIGNIFICANT CHANGE UP (ref 27–34)
MCHC RBC-ENTMCNC: 33 GM/DL — SIGNIFICANT CHANGE UP (ref 32–36)
MCV RBC AUTO: 86.4 FL — SIGNIFICANT CHANGE UP (ref 80–100)
MONOCYTES # BLD AUTO: 0.95 K/UL — HIGH (ref 0–0.9)
MONOCYTES NFR BLD AUTO: 6.8 % — SIGNIFICANT CHANGE UP (ref 2–14)
NEUTROPHILS # BLD AUTO: 11.18 K/UL — HIGH (ref 1.8–7.4)
NEUTROPHILS NFR BLD AUTO: 79.4 % — HIGH (ref 43–77)
NITRITE UR-MCNC: NEGATIVE — SIGNIFICANT CHANGE UP
NRBC # BLD: 0 /100 WBCS — SIGNIFICANT CHANGE UP (ref 0–0)
PH UR: 6.5 — SIGNIFICANT CHANGE UP (ref 5–8)
PHOSPHATE SERPL-MCNC: 2.6 MG/DL — SIGNIFICANT CHANGE UP (ref 2.5–4.5)
PLATELET # BLD AUTO: 476 K/UL — HIGH (ref 150–400)
POTASSIUM SERPL-MCNC: 4.2 MMOL/L — SIGNIFICANT CHANGE UP (ref 3.5–5.3)
POTASSIUM SERPL-SCNC: 4.2 MMOL/L — SIGNIFICANT CHANGE UP (ref 3.5–5.3)
PROT SERPL-MCNC: 8.3 G/DL — SIGNIFICANT CHANGE UP (ref 6–8.3)
PROT UR-MCNC: NEGATIVE — SIGNIFICANT CHANGE UP
PROTHROM AB SERPL-ACNC: 13.2 SEC — SIGNIFICANT CHANGE UP (ref 10.5–13.4)
RBC # BLD: 5.09 M/UL — SIGNIFICANT CHANGE UP (ref 3.8–5.2)
RBC # FLD: 13.3 % — SIGNIFICANT CHANGE UP (ref 10.3–14.5)
RBC CASTS # UR COMP ASSIST: 0 /HPF — SIGNIFICANT CHANGE UP (ref 0–4)
SODIUM SERPL-SCNC: 140 MMOL/L — SIGNIFICANT CHANGE UP (ref 135–145)
SP GR SPEC: 1 — LOW (ref 1.01–1.02)
TROPONIN T, HIGH SENSITIVITY RESULT: <6 NG/L — SIGNIFICANT CHANGE UP (ref 0–51)
UROBILINOGEN FLD QL: NEGATIVE — SIGNIFICANT CHANGE UP
WBC # BLD: 14.06 K/UL — HIGH (ref 3.8–10.5)
WBC # FLD AUTO: 14.06 K/UL — HIGH (ref 3.8–10.5)
WBC UR QL: 2 /HPF — SIGNIFICANT CHANGE UP (ref 0–5)

## 2022-03-07 PROCEDURE — 99285 EMERGENCY DEPT VISIT HI MDM: CPT | Mod: 25

## 2022-03-07 PROCEDURE — 71045 X-RAY EXAM CHEST 1 VIEW: CPT | Mod: 26

## 2022-03-07 PROCEDURE — 70450 CT HEAD/BRAIN W/O DYE: CPT | Mod: MA

## 2022-03-07 PROCEDURE — 99285 EMERGENCY DEPT VISIT HI MDM: CPT

## 2022-03-07 PROCEDURE — 85025 COMPLETE CBC W/AUTO DIFF WBC: CPT

## 2022-03-07 PROCEDURE — 93005 ELECTROCARDIOGRAM TRACING: CPT

## 2022-03-07 PROCEDURE — 87086 URINE CULTURE/COLONY COUNT: CPT

## 2022-03-07 PROCEDURE — 83735 ASSAY OF MAGNESIUM: CPT

## 2022-03-07 PROCEDURE — 84702 CHORIONIC GONADOTROPIN TEST: CPT

## 2022-03-07 PROCEDURE — 85730 THROMBOPLASTIN TIME PARTIAL: CPT

## 2022-03-07 PROCEDURE — 84484 ASSAY OF TROPONIN QUANT: CPT

## 2022-03-07 PROCEDURE — 85610 PROTHROMBIN TIME: CPT

## 2022-03-07 PROCEDURE — 93000 ELECTROCARDIOGRAM COMPLETE: CPT

## 2022-03-07 PROCEDURE — 36415 COLL VENOUS BLD VENIPUNCTURE: CPT

## 2022-03-07 PROCEDURE — 99284 EMERGENCY DEPT VISIT MOD MDM: CPT | Mod: GC

## 2022-03-07 PROCEDURE — 81001 URINALYSIS AUTO W/SCOPE: CPT

## 2022-03-07 PROCEDURE — 82962 GLUCOSE BLOOD TEST: CPT

## 2022-03-07 PROCEDURE — 99214 OFFICE O/P EST MOD 30 MIN: CPT

## 2022-03-07 PROCEDURE — 80053 COMPREHEN METABOLIC PANEL: CPT

## 2022-03-07 PROCEDURE — 70450 CT HEAD/BRAIN W/O DYE: CPT | Mod: 26,MA

## 2022-03-07 PROCEDURE — 84100 ASSAY OF PHOSPHORUS: CPT

## 2022-03-07 PROCEDURE — 71045 X-RAY EXAM CHEST 1 VIEW: CPT

## 2022-03-07 RX ORDER — NIFEDIPINE 30 MG/1
30 TABLET, EXTENDED RELEASE ORAL
Qty: 30 | Refills: 5 | Status: DISCONTINUED | COMMUNITY
Start: 2021-12-16 | End: 2022-03-07

## 2022-03-07 NOTE — ED PROVIDER NOTE - ATTENDING CONTRIBUTION TO CARE
37 y/o f with pmhx post partum HTN (improved) presents from cardiologist office for left arm tingling / numbness extending to left leg. Began last night. last known well was at 8 pm last night. today worsening with left leg involved which began at around 9:30 am. no change in speech no change in hearing. no change in vision. no fever no chills no abd pain no chest pain. first time having this. She called her sister who is a neurologist who rec she come to get checked out.      Gen.  no acute distress, well appearing female  HEENT: pupils 3 mm reactive to light. no facial asymmetry. neck supple   Lungs:  b/l bs  CVS: S1S2   Abd;  soft no guarding no distention  Ext: no pitting edema no erythema  Neuro: awake, alert, oriented to person place and time. clear speech steady gait. clear speech. Babinsky reflex downward b/l, no pronator drift, normal finger to nose b/l, normal heel to shin b/l. reflexes 2+ /4 L4, S1  MSK: MSK: strength exam  Upper:   C5 (elbow flex) R 5/5  L 5/5  C 6 (Wrist ext) R 5/5  L 5/5  C7 (Elbow ext) R 5/5  L 5/5  Lower:  L2 (Hip flex)   R  5/5  L 5/5  L3 (knee exten) R  5/5  L 5/5  L4 (ankle dorsiflex)  R  5/5 L 5/5  L5 (bog toe ext)  R 5/5   L 5/5  S1 (ankle plant flex)  R 5/5  L 5/5

## 2022-03-07 NOTE — ED ADULT NURSE NOTE - OBJECTIVE STATEMENT
Pt is a 36yoF  last child in November, was placed on nifedipine for 7 weeks due to elevated BP, now no longer on any medication stated last night around 9pm she felt tingliness in her LUE, went to sleep. Woke up this morning and had it continue to her LLE. Presents to ED with no complaints of headache, no neurological deficits, AAOX4 in no distress besides this numbness / tingly sensation. States she has also been tachycardic and has been evaluated for her thyroid and has had an US of it but does not have her results yet.

## 2022-03-07 NOTE — HISTORY OF PRESENT ILLNESS
[FreeTextEntry1] : 36F who presents for evaluation of tachycardia, postpartum HTN, dyspnea\par \par Delivered 3rd child in Nov 2021, developed postpartum HTN- Had been on nifedipine until mid January and since then BP has been better controlled\par Has noticed over the last few weeks worsening dyspnea, tachycardia, gets winded reading books to her children\par Also with occasional chest tightness, lightheadedness. \par Low energy, no syncope \par 3rd pregnancy- no issues with first 2\par Has Mirena IUD in place- put in in January \par \par COVID in July 2021\par Booster in Jan 2022\par \par Never smoker. Both parents have HTN, some heart disease in grandparents. Special \par \par ECG:\par \par \par \par \par

## 2022-03-07 NOTE — ED ADULT NURSE REASSESSMENT NOTE - NS ED NURSE REASSESS COMMENT FT1
pt passed swallow screening, slightly tachycardic but v/s stable. Numbness / tingling sensation continues on the L side, AAOX4 in no distress.

## 2022-03-07 NOTE — ED PROVIDER NOTE - CLINICAL SUMMARY MEDICAL DECISION MAKING FREE TEXT BOX
ATTG: : left side tingling upper and lower will check labs, check check ct head, check neuro eval and re eval for dispo

## 2022-03-07 NOTE — CONSULT NOTE ADULT - SUBJECTIVE AND OBJECTIVE BOX
HPI:  36 year old right-handed woman with past medical history post-partum hypertension (now off nifedipine) presents as CODE STROKE for tingling of her left arm and leg. She states she was in her normal state of health yesterday evening at around 8:00pm. She was sitting on the couch and suddenly developed tingling of her arm at approx 9:00pm. This morning at 9:30am, she developed tingling of her entire foot that progressed up to involve her leg. She does not remember which position she was in, has never experienced something similar. She denies speech changes, vision changes, gait imbalance, headache, unilateral weakness, chest pain, palpitations, nausea, vomiting. Patient called her sister who is a neurologist who advised that patient come in to the ED for imaging. Patient gave birth via  3 months ago. She reports sleeping well. Patient had carpal tunnel of both hands after another childbirth, but has since resolved. Of note, she has been undergoing workup for tachycardia at rest with Cardiology. Recent thyroid function testing has been normal.    LKN 3/6/22 at 8:00pm, preMRS 0, NIHSS 0. CT head negative for hemorrhage/large territory infarct. Outside window for tPA and thrombectomy deferred.    MEDICATIONS  (STANDING):    MEDICATIONS  (PRN):    PAST MEDICAL & SURGICAL HISTORY:  Hemorrhage after vaginal delivery  Spontaneous vaginal delivery  Hyperemesis gravidarum  2019 novel coronavirus disease (COVID-19)  History of laparoscopic appendectomy  H/O dilation and curettage    FAMILY HISTORY:  No pertinent neurological history    Allergies  amoxicillin (Unknown)  Bactrim (Unknown)  sulfa drugs (Unknown)    SHx - No smoking, No ETOH, No drug abuse    Review of Systems:  CONSTITUTIONAL: No fevers, chills  HEENT:  No visual loss, blurred vision, double vision.  No hearing loss, sneezing, congestion, runny nose or sore throat.  SKIN:  No rash or itching.  CARDIOVASCULAR:  No chest pain, chest pressure or chest discomfort. No palpitations or edema.  RESPIRATORY:  No shortness of breath, cough or sputum.  GASTROINTESTINAL:  No anorexia, nausea, vomiting or diarrhea. No abdominal pain.  GENITOURINARY:  NO dysuria. No increased frequency. No retention. No incontinence.  NEUROLOGICAL:  See HPI  MUSCULOSKELETAL:  No muscle, back pain, joint pain or stiffness.  HEMATOLOGIC:  No anemia, bleeding or bruising.    Vital Signs Last 24 Hrs  T(C): 36.7 (07 Mar 2022 12:33), Max: 36.7 (07 Mar 2022 12:33)  T(F): 98 (07 Mar 2022 12:33), Max: 98 (07 Mar 2022 12:33)  HR: 117 (07 Mar 2022 12:53) (117 - 144)  BP: 124/81 (07 Mar 2022 12:53) (124/81 - 137/90)  BP(mean): --  RR: 18 (07 Mar 2022 12:53) (18 - 20)  SpO2: 100% (07 Mar 2022 12:53) (99% - 100%)    PHYSICAL EXAM:  GENERAL: NAD  HEENT: Normocephalic;  conjunctivae and sclerae clear; moist mucous membranes;   NECK : supple  CHEST/LUNG: Clear to auscultation bilaterally with good air entry   HEART: S1 S2  regular; no murmurs, gallops or rubs  ABDOMEN: Soft, Nontender, Nondistended; Bowel sounds present  EXTREMITIES: no cyanosis; no edema; no calf tenderness  SKIN: warm and dry; no rash             Neurological Exam:  - Mental Status:  AAOx3; speech is fluent with intact naming, repetition, and comprehension  - Cranial Nerves II-XII:    II:  PERRLA; visual fields are full to confrontation  III, IV, VI:  EOMI, no nystagmus  V:  facial sensation is intact in the V1-V3 distribution bilaterally.  VII:  face is symmetric with normal eye closure and smile  VIII:  hearing is intact to finger rub  IX, X:  uvula is midline and soft palate rises symmetrically  XI:  head turning and shoulder shrug are intact bilaterally  XII:  tongue protrudes in the midline  - Motor:  strength is 5/5 throughout; normal muscle bulk and tone throughout; no pronator drift  - Reflexes:  2+ and symmetric at the biceps, triceps, brachioradialis, knees, and ankles;  plantar reflexes downgoing bilaterally  - Sensory:  intact to light touch, pin prick, vibration, and joint-position sense throughout  - Coordination:  finger-nose-finger and heel-knee-shin intact without dysmetria; rapid alternating hand movements intact  - Gait:  normal steps, base, arm swing, and turning; heel and toe walking are normal; tandem gait is normal; Romberg testing is negative    Other:    -    140  |  103  |  9   ----------------------------<  110<H>  4.2   |  22  |  0.81    Ca    10.1      07 Mar 2022 12:50  Phos  2.6     03-07  Mg     2.1     -07    TPro  8.3  /  Alb  5.2<H>  /  TBili  0.5  /  DBili  x   /  AST  21  /  ALT  26  /  AlkPhos  101  03-07                            14.5   14.06 )-----------( 476      ( 07 Mar 2022 12:50 )             44.0       Radiology    CT:   MRI  EKG:  tele:  TTE:  EEG:              HPI:  36 year old right-handed woman with past medical history post-partum hypertension (now off nifedipine) and migraine with visual aura presents as CODE STROKE for tingling of her left arm and leg. She states she was in her normal state of health yesterday evening at around 8:00pm. She was sitting on the couch and suddenly developed tingling of her arm at approx 9:00pm. This morning at 9:30am, she developed tingling of her entire foot that progressed up to involve her leg. She does not remember which position she was in, has never experienced something similar. She denies speech changes, vision changes, gait imbalance, headache, unilateral weakness, chest pain, palpitations, nausea, vomiting. Patient called her sister who is a neurologist who advised that patient come in to the ED for imaging. Patient gave birth via  3 months ago. She reports sleeping well. Patient had carpal tunnel of both hands after another childbirth, but has since resolved. Of note, she has been undergoing workup for tachycardia at rest with Cardiology. Recent thyroid function testing has been normal.    LKN 3/6/22 at 8:00pm, preMRS 0, NIHSS 0. CT head negative for hemorrhage/large territory infarct. Outside window for tPA and thrombectomy deferred.    MEDICATIONS  (STANDING):    MEDICATIONS  (PRN):    PAST MEDICAL & SURGICAL HISTORY:  Hemorrhage after vaginal delivery  Spontaneous vaginal delivery  Hyperemesis gravidarum  2019 novel coronavirus disease (COVID-19)  History of laparoscopic appendectomy  H/O dilation and curettage    FAMILY HISTORY:  No pertinent neurological history    Allergies  amoxicillin (Unknown)  Bactrim (Unknown)  sulfa drugs (Unknown)    SHx - No smoking, No ETOH, No drug abuse    Review of Systems:  CONSTITUTIONAL: No fevers, chills  HEENT:  No visual loss, blurred vision, double vision.  No hearing loss, sneezing, congestion, runny nose or sore throat.  SKIN:  No rash or itching.  CARDIOVASCULAR:  No chest pain, chest pressure or chest discomfort. No palpitations or edema.  RESPIRATORY:  No shortness of breath, cough or sputum.  GASTROINTESTINAL:  No anorexia, nausea, vomiting or diarrhea. No abdominal pain.  GENITOURINARY:  NO dysuria. No increased frequency. No retention. No incontinence.  NEUROLOGICAL:  See HPI  MUSCULOSKELETAL:  No muscle, back pain, joint pain or stiffness.  HEMATOLOGIC:  No anemia, bleeding or bruising.    Vital Signs Last 24 Hrs  T(C): 36.7 (07 Mar 2022 12:33), Max: 36.7 (07 Mar 2022 12:33)  T(F): 98 (07 Mar 2022 12:33), Max: 98 (07 Mar 2022 12:33)  HR: 117 (07 Mar 2022 12:53) (117 - 144)  BP: 124/81 (07 Mar 2022 12:53) (124/81 - 137/90)  BP(mean): --  RR: 18 (07 Mar 2022 12:53) (18 - 20)  SpO2: 100% (07 Mar 2022 12:53) (99% - 100%)    PHYSICAL EXAM:  GENERAL: NAD  HEENT: Normocephalic;  conjunctivae and sclerae clear; moist mucous membranes;   NECK: Supple  EXTREMITIES: No cyanosis; no edema; no calf tenderness  SKIN: warm and dry; no rash             Neurological Exam:  - Mental Status: Alert, oriented to person, place, time, situation; speech is fluent with intact naming, repetition, and comprehension  - Cranial Nerves II-XII:    II:  PERRL 3mm b/l; visual fields are full to confrontation  III, IV, VI:  EOMI, no nystagmus  V:  facial sensation is intact in the V1-V3 distribution bilaterally.  VII:  face is symmetric with normal eye closure and smile  VIII:  hearing is intact to finger rub  IX, X:  uvula is midline and soft palate rises symmetrically  XI:  head turning and shoulder shrug are intact bilaterally  XII:  tongue protrudes in the midline  - Motor:  strength is 5/5 throughout; normal muscle bulk and tone throughout; no pronator drift  - Reflexes: 3+ and symmetric at the biceps, triceps, brachioradialis, +pectoralis bilaterally; knees, and ankles; +Armando's bilaterally; plantar reflexes downgoing bilaterally  - Sensory:  intact to light touch, pin prick and symmetric throughout  - Coordination:  finger-nose-finger and heel-knee-shin intact without dysmetria; rapid alternating hand movements intact  - Gait:  normal steps, base, arm swing, and turning; Romberg testing is negative    Other:    -    140  |  103  |  9   ----------------------------<  110<H>  4.2   |  22  |  0.81    Ca    10.1      07 Mar 2022 12:50  Phos  2.6     03-07  Mg     2.1     03-07    TPro  8.3  /  Alb  5.2<H>  /  TBili  0.5  /  DBili  x   /  AST  21  /  ALT  26  /  AlkPhos  101  03-07                            14.5   14.06 )-----------( 476      ( 07 Mar 2022 12:50 )             44.0       Radiology  CT Brain Stroke Protocol (22 @ 12:56) >  IMPRESSION: Unremarkable noncontrast CT of the brain.

## 2022-03-07 NOTE — ED PROVIDER NOTE - PATIENT PORTAL LINK FT
Include Location In Plan?: Yes
Detail Level: Simple
You can access the FollowMyHealth Patient Portal offered by [facility] by registering at the following website: [email or web address]. By joining [employer]’s FollowMyHealth portal, you will also be able to view your health information using other applications (apps) compatible with our system.

## 2022-03-07 NOTE — DISCUSSION/SUMMARY
[FreeTextEntry1] : Dyspnea, tachycardia, since delivery, possibly worsening as of late\par \par Postpartum vs Booster related vs related to Mirena IUD?\par \par Check ECG today- tachycardic on exam\par Check routine labs including D-Dimer, proBNP to rule out DVT, CHF although low suspicion for both\par Check echo\par Encourage hydration\par \par BP is excellent, she is to maintain off of nifedipine\par \par Follow up after testing\par Continue to monitor symptoms closely

## 2022-03-07 NOTE — CONSULT NOTE ADULT - ATTENDING COMMENTS
HPI as per resident note, personally verified by me. Reports symptoms began suddenly around ~20:00 involving the LUE and gradually spread to LLE over a period of ~13 hours. No weakness or other associated symptoms and feels she is slightly better but definitely not any worse. Does report history of migraine headaches with visual aura but no headache currently. Has a sister who is a neurologist    Fundoscopy deferred secondary to safety precautions with COVID pandemic    - Mental Status:  AAOx3; speech is fluent with intact naming, repetition, and comprehension. Follows commands. Attn/conc, recent and remote memory, fund of knowledge WNL  - Cranial Nerves II-XII:    II:  PERRLA; visual fields are full to confrontation  III, IV, VI:  EOMI, no nystagmus  V:  facial sensation is intact in the V1-V3 distribution bilaterally.  VII:  face is symmetric with normal eye closure and smile  VIII:  hearing is intact to finger rub  IX, X:  uvula is midline and soft palate rises symmetrically  XI:  head turning and shoulder shrug are intact bilaterally  XII:  tongue protrudes in the midline  - Motor:  strength is 5/5 throughout; normal muscle bulk and tone throughout; no pronator drift  - Reflexes:  3+ biceps/BR b/l, 2+ and brisk triceps/KJ b/l, 2+ AJ b/l, and plantar reflexes downgoing bilaterally  - Sensory:  intact to light touch, pin prick, vibration, and joint-position sense throughout  - Coordination:  finger-nose-finger and heel-knee-shin intact without dysmetria; rapid alternating hand movements intact  - Gait:  normal steps, base, arm swing, and turning; heel and toe walking are normal; tandem gait is normal; Romberg testing is negative    A/P:  Skin sensation disturbance  Thrombocytosis  Leukocytosis    - Positive neurologic phenomenon of tingling likely related to systemic response. Less likely migraine as no headache and not consistent with seizure. Her head imaging is unrevealing and neurologic exam is normal and non-focal. Although we cannot definitively rule out other causes, such as CNS demyelinating, much lower suspicion and even if present sensory only symptoms would not be a definite reason for treatment with high dose steroids. She has a sister who is a neurologist and would prefer to have work-up done as outpatient, if able  - No neurologic contraindications to discharge if patient is otherwise medically stable. She can get recommended MRI brain w/o and MRI c-spine w/o completed as outpatient  - Continue to address above medical problems, as you are doing  - Will sign off, please call with additional questions or concerns

## 2022-03-07 NOTE — PHYSICAL EXAM

## 2022-03-07 NOTE — ED PROVIDER NOTE - OBJECTIVE STATEMENT
36 year old female with PMH postpartum HTN, migraines presents with left arm and leg numbness since last night. Pt states LKN 2000 last night, reports gradual onset LUE paresthesias followed by LLE paresthesias. Denies any fevers, chest pain, shortness of breath, abdominal pain, vomiting, diarrhea, bloody stools, black tarry stools, dysuria, headache, vision change, weakness, or rash. Denies any aspirin or AC use. Denies any additional complaints.

## 2022-03-07 NOTE — ED PROVIDER NOTE - NSFOLLOWUPINSTRUCTIONS_ED_ALL_ED_FT
Rest and stay well hydrated.    Follow up with your primary care physician in 1-3 days.    Follow up with your neurologist as discussed.    Return immediately with any new or worsening symptoms including new weakness, difficulty speaking, difficulty walking, vomiting where you can't tolerate any fluids by mouth, fevers, chest pain, shortness of breath, or any additional concerns.

## 2022-03-07 NOTE — ED ADULT NURSE REASSESSMENT NOTE - NS ED NURSE REASSESS COMMENT FT1
Neuro team at bedside assessing/evaluating pt. Pt continues to be AAOX4 in no distress neuro intact.

## 2022-03-07 NOTE — ED PROVIDER NOTE - PROGRESS NOTE DETAILS
[facility]amonte-PGY3: discussed with neuro, recommending outpatient follow up with pt's neurologist, discussed return precautions in detail, pt understood and agreeable with plan.

## 2022-03-07 NOTE — ED PROVIDER NOTE - PHYSICAL EXAMINATION
CONSTITUTIONAL: non-toxic, well appearing  SKIN: no rash, no petechiae.  EYES: PERRL, EOMI, pink conjunctiva, anicteric  ENT: tongue and uvular midline, no exudates, moist mucous membranes  NECK: Supple; no meningismus, no JVD  CARD: RRR, no murmurs, equal radial pulses bilaterally 2+  RESP: CTAB, no respiratory distress  ABD: Soft, non-tender, non-distended, no peritoneal signs  EXT: Normal ROM x4. No edema. No calf tenderness  NEURO: Alert, oriented. Neuro exam nonfocal, equal strength bilaterally  PSYCH: Cooperative, appropriate.

## 2022-03-08 LAB
ALBUMIN SERPL ELPH-MCNC: 4.8 G/DL
ALP BLD-CCNC: 93 U/L
ALT SERPL-CCNC: 24 U/L
ANION GAP SERPL CALC-SCNC: 16 MMOL/L
AST SERPL-CCNC: 19 U/L
BILIRUB SERPL-MCNC: 0.4 MG/DL
BUN SERPL-MCNC: 9 MG/DL
CALCIUM SERPL-MCNC: 9.8 MG/DL
CHLORIDE SERPL-SCNC: 106 MMOL/L
CHOLEST SERPL-MCNC: 180 MG/DL
CO2 SERPL-SCNC: 19 MMOL/L
CREAT SERPL-MCNC: 0.79 MG/DL
CRP SERPL-MCNC: <3 MG/L
CULTURE RESULTS: SIGNIFICANT CHANGE UP
EGFR: 101 ML/MIN/1.73M2
GLUCOSE SERPL-MCNC: 111 MG/DL
HDLC SERPL-MCNC: 87 MG/DL
LDLC SERPL CALC-MCNC: 82 MG/DL
NONHDLC SERPL-MCNC: 93 MG/DL
NT-PROBNP SERPL-MCNC: 77 PG/ML
POTASSIUM SERPL-SCNC: 4 MMOL/L
PROT SERPL-MCNC: 7.7 G/DL
SODIUM SERPL-SCNC: 141 MMOL/L
SPECIMEN SOURCE: SIGNIFICANT CHANGE UP
TRIGL SERPL-MCNC: 53 MG/DL
TSH SERPL-ACNC: 0.66 UIU/ML

## 2022-03-10 ENCOUNTER — APPOINTMENT (OUTPATIENT)
Dept: ULTRASOUND IMAGING | Facility: CLINIC | Age: 37
End: 2022-03-10

## 2022-03-14 ENCOUNTER — TRANSCRIPTION ENCOUNTER (OUTPATIENT)
Age: 37
End: 2022-03-14

## 2022-03-14 ENCOUNTER — APPOINTMENT (OUTPATIENT)
Dept: INTERNAL MEDICINE | Facility: CLINIC | Age: 37
End: 2022-03-14
Payer: COMMERCIAL

## 2022-03-14 PROCEDURE — 93306 TTE W/DOPPLER COMPLETE: CPT

## 2022-03-17 ENCOUNTER — APPOINTMENT (OUTPATIENT)
Dept: CARDIOLOGY | Facility: CLINIC | Age: 37
End: 2022-03-17

## 2022-03-31 ENCOUNTER — APPOINTMENT (OUTPATIENT)
Dept: CV DIAGNOSTICS | Facility: HOSPITAL | Age: 37
End: 2022-03-31

## 2022-04-06 ENCOUNTER — APPOINTMENT (OUTPATIENT)
Dept: UROLOGY | Facility: CLINIC | Age: 37
End: 2022-04-06

## 2022-04-15 ENCOUNTER — APPOINTMENT (OUTPATIENT)
Dept: ENDOCRINOLOGY | Facility: CLINIC | Age: 37
End: 2022-04-15
Payer: COMMERCIAL

## 2022-04-15 VITALS
HEIGHT: 67 IN | WEIGHT: 129 LBS | HEART RATE: 73 BPM | OXYGEN SATURATION: 100 % | DIASTOLIC BLOOD PRESSURE: 80 MMHG | BODY MASS INDEX: 20.25 KG/M2 | SYSTOLIC BLOOD PRESSURE: 129 MMHG | TEMPERATURE: 98 F

## 2022-04-15 PROCEDURE — 99205 OFFICE O/P NEW HI 60 MIN: CPT | Mod: 25

## 2022-04-15 PROCEDURE — 36415 COLL VENOUS BLD VENIPUNCTURE: CPT

## 2022-04-15 NOTE — CONSULT LETTER
[Dear  ___] : Dear  [unfilled], [Sincerely,] : Sincerely, [FreeTextEntry1] : Thank you for referring  Ms. JESSICA JACOBS to me for evaluation and treatment. Please, see attached consultation note. As always, if there are specific questions you would like to discuss, please feel free to contact me.\par Thank you for the courtesy of this evaluation.\par  [FreeTextEntry3] : Bennie Oconnell MD, FACE, ECNU\par

## 2022-04-15 NOTE — HISTORY OF PRESENT ILLNESS
[FreeTextEntry1] : 36 year female referred for thyroid evaluation.\par \par Ms. JACOBS  denies prior history of thyroid disorders. She had her 3rd baby in 11/21 and about 2 months later she started having a severe neck pain radiating to the right ear. (+) facial flushing, lost about 20 lbs, but she's still breastfeeding, hands and left tingling sensation. She was on Nifedipine for elevated blood pressure at that time, and was attributing her symptoms to a medication side effects.  She eventually saw an ENT at that time, who did not find any signs of infection. However he ran her thyroid functions, that were normal, and did a thyroid sonogram.\par Thyroid US (3/7/2022)–heterogeneous thyroid.  Ill-defined irregular hypoechoic heterogeneity in the right midpole measuring 1.4 x 0.6 cm\par Labs from April 7, 2022–TSH–0.086, free T4-1.66\par From 3/3/2022–TSH 0.618, free T4-1.47\par She saw Dr. Olson, and reportedly her neurological work up was fine, including MRI of the brain and neck/spine. She also Dr. Gonzalez for her tachycardia.  Transthoracic echo from March 14, 2022 is normal\par By now her neck and ear pain have subsided significantly.  This is her third pregnancy, and she did not have any thyroid issues during the first two.\par She has Mirena IUD- placed in January. She's taking Magnesium OTC and no other supplements. \par Denies family h/o thyroid cancer or history of radiation exposure to head and neck area in a childhood. Her mother has hypothyroidism. \par

## 2022-04-15 NOTE — REASON FOR VISIT
[Consultation] : a consultation visit [Hyperthyroidism] : hyperthyroidism [Thyroid nodule/ MNG] : thyroid nodule/ MNG

## 2022-04-15 NOTE — ASSESSMENT
[FreeTextEntry1] : 1.  Hypothyroidism.  Most likely postpartum thyroiditis.\par Possible causes of hyperthyroidism, including Graves’ disease, toxic MNG, and subacute thyroiditis as well as labs/tests needed for proper diagnosis, were reviewed with the patient. \par - Repeat an extended thyroid panel, ESR, C-reactive protein\par – We discussed a potential need of iodine–123 uptake and scan, however I would like to avoid it at present given her current breast-feeding.\par If the work-up is consistent with a thyroiditis, we will continue with the conservative management.  We discussed in details a current approach to management of the thyroid postpartum thyroiditis, typical course including a hyper and hypothyroid phases.  She is clinically improving and can continue with nonsteroidal anti-inflammatory on as-needed basis\par \par 2. Right thyroid nodule\par We discussed that nodules that are seen in acute thyroiditis phase frequently resolve or shrink in size once thyroiditis improves.  I would not proceed with biopsy at this point, and just monitor sonographically.  I am planning to repeat another thyroid ultrasound in about 6 months\par We will discuss the results next week over the phone and decide on the further course of action.\par \par

## 2022-04-21 ENCOUNTER — TRANSCRIPTION ENCOUNTER (OUTPATIENT)
Age: 37
End: 2022-04-21

## 2022-04-21 LAB
ALBUMIN SERPL ELPH-MCNC: 5.1 G/DL
ALP BLD-CCNC: 109 U/L
ALT SERPL-CCNC: 21 U/L
ANION GAP SERPL CALC-SCNC: 13 MMOL/L
AST SERPL-CCNC: 17 U/L
BASOPHILS # BLD AUTO: 0.03 K/UL
BASOPHILS NFR BLD AUTO: 0.4 %
BILIRUB SERPL-MCNC: 0.4 MG/DL
BUN SERPL-MCNC: 12 MG/DL
CALCIUM SERPL-MCNC: 10.5 MG/DL
CHLORIDE SERPL-SCNC: 103 MMOL/L
CO2 SERPL-SCNC: 25 MMOL/L
CREAT SERPL-MCNC: 0.73 MG/DL
CRP SERPL-MCNC: <3 MG/L
EGFR: 111 ML/MIN/1.73M2
EOSINOPHIL # BLD AUTO: 0.03 K/UL
EOSINOPHIL NFR BLD AUTO: 0.4 %
ERYTHROCYTE [SEDIMENTATION RATE] IN BLOOD BY WESTERGREN METHOD: 37 MM/HR
GLUCOSE SERPL-MCNC: 80 MG/DL
HCT VFR BLD CALC: 46.8 %
HGB BLD-MCNC: 14.6 G/DL
IMM GRANULOCYTES NFR BLD AUTO: 0.3 %
LYMPHOCYTES # BLD AUTO: 1.76 K/UL
LYMPHOCYTES NFR BLD AUTO: 25.6 %
MAN DIFF?: NORMAL
MCHC RBC-ENTMCNC: 28 PG
MCHC RBC-ENTMCNC: 31.2 GM/DL
MCV RBC AUTO: 89.8 FL
MONOCYTES # BLD AUTO: 0.45 K/UL
MONOCYTES NFR BLD AUTO: 6.6 %
NEUTROPHILS # BLD AUTO: 4.58 K/UL
NEUTROPHILS NFR BLD AUTO: 66.7 %
PLATELET # BLD AUTO: 412 K/UL
POTASSIUM SERPL-SCNC: 4.9 MMOL/L
PROT SERPL-MCNC: 7.9 G/DL
RBC # BLD: 5.21 M/UL
RBC # FLD: 13.8 %
SODIUM SERPL-SCNC: 141 MMOL/L
T3 SERPL-MCNC: 141 NG/DL
T4 FREE SERPL-MCNC: 1.5 NG/DL
THYROGLOB AB SERPL-ACNC: <20 IU/ML
THYROPEROXIDASE AB SERPL IA-ACNC: <10 IU/ML
TSH RECEPTOR AB: <1.1 IU/L
TSH SERPL-ACNC: 0.08 UIU/ML
TSI ACT/NOR SER: <0.1 IU/L
WBC # FLD AUTO: 6.87 K/UL

## 2022-05-04 NOTE — ED ADULT TRIAGE NOTE - BP NONINVASIVE SYSTOLIC (MM HG)
"Subjective     Eli Robles is a 43 y.o. female    You have chosen to receive care through a telehealth visit.  Do you consent to use a video/audio connection for your medical care today? Yes    Here for follow up on weight loss. She has been on Phentermine for 4 months. She has lost 7 pounds. She has lost a total of 22 pounds. She has not had any side effects from the medication. She has been doing well with diet and exercise and has been drinking at least 64 ozs. of water daily. We discussed increasing Protein in her diet.          /76   Ht 162.6 cm (64\")   Wt 64 kg (141 lb)   LMP 01/13/2011 (Exact Date)   BMI 24.20 kg/m²     Outpatient Encounter Medications as of 5/4/2022   Medication Sig Dispense Refill   • Adalimumab (HUMIRA PEN) 40 MG/0.8ML Pen-injector Kit Inject 40 mg under the skin Every 14 (Fourteen) Days. (Patient taking differently: Inject 40 mg under the skin into the appropriate area as directed Every 7 (Seven) Days.) 2 each 11   • cetirizine (zyrTEC) 5 MG tablet Take 5 mg by mouth Daily.     • escitalopram (LEXAPRO) 10 MG tablet Take 1 tablet by mouth Daily. 30 tablet 12   • hydrocortisone (ANUSOL-HC) 25 MG suppository      • levothyroxine (Synthroid) 25 MCG tablet Take 1 tablet by mouth Daily. 30 tablet 3   • Multiple Vitamins-Minerals (ALIVE WOMENS GUMMY) chewable tablet Alive Women Gummy Vit(choline)     • phentermine (ADIPEX-P) 37.5 MG tablet Take 1 tablet by mouth Every Morning Before Breakfast. 30 tablet 0   • Probiotic Product (UP4 PROBIOTICS ADULT PO) Take  by mouth.     • vitamin D (ERGOCALCIFEROL) 1.25 MG (79504 UT) capsule capsule Take 1 capsule by mouth 1 (One) Time Per Week. 5 capsule 3     No facility-administered encounter medications on file as of 5/4/2022.       Surgical History  Past Surgical History:   Procedure Laterality Date   • ADENOIDECTOMY     • CHOLECYSTECTOMY     • COLONOSCOPY  01/31/2014    Ulcerative proctosigmoiditis; Biopsies obtained from right colon, " transverse, descending, sigmoid, and rectum; Repeat 2 years   • COLONOSCOPY  01/13/2012    Inflammation was found from the anus to the rectum secondary to proctitis ulcerative colitis-biopsied; Four biopsies taken every 10cm from the right colon, transverse, descending, sigmoid, and rectum; Background biopsies taken from rectum; Normal mucosa ascending, descending, sigmoid, cecum, splenic flexure, transverse colon, and hepatic flexure-biopsied; Repeat 2 years   • COLONOSCOPY  03/19/2008    Dr. LeungDinwi-Kemuznakoqcvbdbvq-xzuxzcuo; Small polyp at 70cm-path shows polypoid excrescence; Random biopsies taken from right colon, transverse, and left colon; Repeat 3 years   • COLONOSCOPY  07/28/2005    Dr. Quiroz-Diffuse colitis; Biopsies obtained from ascending colon, transverse, sigmoid, and rectum   • COLONOSCOPY  09/18/2020    Dr. Curly Ng-Mild diverticulosis of the ascending colon; Erythema, friability and ulceration in the distal sigmoid colon and rectum-biopsied; Internal hemorrhoids   • COLONOSCOPY  07/09/2019    Dr. Curly Ng-Mild diverticulosis of the whole colon; Erythema and friability in the whole colon compatible with ulcerative colitis-biopsied; Internal hemorrhoids   • COLONOSCOPY  10/18/2016    Dr. Curly Ng-Internal hemorrhoids; Mild diverticulosis of the whole colon; Erythema and friability in the descending colon, sigmoid colon, and rectum compatible with ulcerative colitis-biopsied   • EAR EXAM UNDER ANESTHESIA/REMOVAL OF FOREIGN BODY Right 10/11/2019    Procedure: RIGHT EAR EXAM UNDER ANESTHESIA;  Surgeon: Rubens Chu MD;  Location: [facility] OR;  Service: ENT   • ENDOSCOPY  01/13/2012    Normal esophagus; Normal stomach; Normal examined duodenum   • HEAD/NECK LESION/CYST EXCISION Left 10/11/2019    Procedure: EXCISION LESION LEFT ANTERIOR CHIN AND LEFT LATERAL CHIN;  Surgeon: Rubens Chu MD;  Location: [facility] OR;  Service: ENT   • HYSTERECTOMY      without BSO   • MYRINGOTOMY W/ TUBES  Left 10/11/2019    Procedure: LEFT MYRINGOTOMY WITH INSERTION OF EAR TUBE;  Surgeon: Rubens Chu MD;  Location: [facility];  Service: ENT   • TONSILLECTOMY     • TUBAL ABDOMINAL LIGATION         Family History  Family History   Problem Relation Age of Onset   • Heart disease Father    • Heart disease Mother    • Diabetes Mother    • No Known Problems Brother    • Diabetes Sister    • Heart disease Paternal Grandfather    • Breast cancer Paternal Grandmother 87   • Ovarian cancer Neg Hx    • Uterine cancer Neg Hx    • Colon cancer Neg Hx    • Melanoma Neg Hx    • Prostate cancer Neg Hx    • Colon polyps Neg Hx    • Esophageal cancer Neg Hx        The following portions of the patient's history were reviewed and updated as appropriate: allergies, current medications, past family history, past medical history, past social history, past surgical history, and problem list.    Review of Systems   Constitutional: Negative for activity change, appetite change, chills, diaphoresis, fatigue, fever, unexpected weight gain and unexpected weight loss.   HENT: Negative for congestion, dental problem, drooling, ear discharge, ear pain, facial swelling, hearing loss, mouth sores, nosebleeds, postnasal drip, rhinorrhea, sinus pressure, sneezing, sore throat, swollen glands, tinnitus, trouble swallowing and voice change.    Eyes: Negative for blurred vision, double vision, photophobia, pain, discharge, redness, itching and visual disturbance.   Respiratory: Negative for apnea, cough, choking, chest tightness, shortness of breath, wheezing and stridor.    Cardiovascular: Negative for chest pain, palpitations and leg swelling.   Gastrointestinal: Negative for abdominal distention, abdominal pain, anal bleeding, blood in stool, constipation, diarrhea, nausea, rectal pain, vomiting, GERD and indigestion.   Endocrine: Negative for cold intolerance, heat intolerance, polydipsia, polyphagia and polyuria.   Genitourinary: Negative  for amenorrhea, breast discharge, breast lump, breast pain, decreased libido, decreased urine volume, difficulty urinating, dyspareunia, dysuria, flank pain, frequency, genital sores, hematuria, menstrual problem, pelvic pain, pelvic pressure, urgency, urinary incontinence, vaginal bleeding, vaginal discharge and vaginal pain.   Musculoskeletal: Negative for arthralgias, back pain, gait problem, joint swelling, myalgias, neck pain, neck stiffness and bursitis.   Skin: Negative for color change, dry skin and rash.   Allergic/Immunologic: Negative for environmental allergies, food allergies and immunocompromised state.   Neurological: Negative for dizziness, tremors, seizures, syncope, facial asymmetry, speech difficulty, weakness, light-headedness, numbness, headache, memory problem and confusion.   Hematological: Negative for adenopathy. Does not bruise/bleed easily.   Psychiatric/Behavioral: Negative for agitation, behavioral problems, decreased concentration, dysphoric mood, hallucinations, self-injury, sleep disturbance, suicidal ideas, negative for hyperactivity, depressed mood and stress. The patient is not nervous/anxious.        Objective   Physical Exam  Vitals and nursing note reviewed.   Constitutional:       Appearance: She is well-developed.   HENT:      Head: Normocephalic and atraumatic.   Eyes:      General:         Right eye: No discharge.         Left eye: No discharge.      Conjunctiva/sclera: Conjunctivae normal.   Neck:      Thyroid: No thyromegaly.   Pulmonary:      Effort: Pulmonary effort is normal.   Musculoskeletal:         General: Normal range of motion.      Cervical back: Normal range of motion.   Skin:     General: Skin is warm and dry.   Neurological:      Mental Status: She is alert and oriented to person, place, and time.   Psychiatric:         Mood and Affect: Mood normal.         Behavior: Behavior normal.         Thought Content: Thought content normal.         Judgment: Judgment  normal.         Assessment/Plan   Diagnoses and all orders for this visit:    1. Weight gain  Comments:  Patient has been on phentermine for 4 months.  She lost 7 pounds.  However she has lost 22 pounds total.  She liked the tablets better. She wishes to stop for now. Will call for telehealth visit if she wishes to restart.     This was an audio and video enabled telemedicine encounter.      BMI is >= 25 and < 30. (Overweight) The following options were offered after discussion: pharmacological intervention options      Jory Elena, APRN  5/4/2022   138

## 2022-05-17 NOTE — PHYSICAL EXAM
[FreeTextEntry1] :   present during exam  [Normal] : supple, no neck mass and thyroid not enlarged [Normal Supraclavicular Lymph Nodes] : normal supraclavicular lymph nodes [Normal Axillary Lymph Nodes] : normal axillary lymph nodes [Normal] : oriented to person, place and time, with appropriate affect [de-identified] : Normal S1, S2.  Regular rate and rhythm [de-identified] : Complete breast exam performed in supine and upright positions.  No palpable masses, tenderness, nipple discharge, inversion, deviation or enlarged axillary or supraclavicular lymph nodes bilaterally. [de-identified] : Clear breath sounds bilaterally, normal respiratory effort.

## 2022-05-17 NOTE — HISTORY OF PRESENT ILLNESS
[de-identified] : Ms. LAMAR JACOBS is a 36 year old woman who presents today for an initial consultation, referred by Dr. Elaine Chopra\par She has a complaint of thrush, she completed 2 rounds of diflucan (5 days each course)\par \par Lamar had her 3rd baby in November of 2021, she is still breast feeding. \par She has a Mirena IUD placed in January of 2022. \par \par Postpartum she had issues with HTN (now resolved and she is off medications), had issues in March of 2022 with tingling to left arm/hand, went to ED, CT and neuro exam all negative. She is under the care of a neurologist now, had MRI of brain, spine/neck, all normal. Also c/o severe neck pain that radiated to her right ear, is seeing endocrinologist, who was concerned for postpartum thyroiditis, labs done. \par \par \par GYN, FAM HX?\par \par \par PCP: Dr. Divina Oquendo \par Cardiologist: Dr. Marcello Gonzalez\par Endocrinologist: Dr. Bennie Oconnell\par

## 2022-05-18 ENCOUNTER — APPOINTMENT (OUTPATIENT)
Dept: SURGICAL ONCOLOGY | Facility: CLINIC | Age: 37
End: 2022-05-18

## 2022-05-18 ENCOUNTER — APPOINTMENT (OUTPATIENT)
Dept: ENDOCRINOLOGY | Facility: CLINIC | Age: 37
End: 2022-05-18
Payer: COMMERCIAL

## 2022-05-18 VITALS
TEMPERATURE: 97.8 F | DIASTOLIC BLOOD PRESSURE: 70 MMHG | OXYGEN SATURATION: 98 % | HEART RATE: 82 BPM | HEIGHT: 67 IN | BODY MASS INDEX: 20.4 KG/M2 | SYSTOLIC BLOOD PRESSURE: 119 MMHG | WEIGHT: 130 LBS

## 2022-05-18 PROCEDURE — 99214 OFFICE O/P EST MOD 30 MIN: CPT | Mod: 25

## 2022-05-18 PROCEDURE — 36415 COLL VENOUS BLD VENIPUNCTURE: CPT

## 2022-05-18 NOTE — ASSESSMENT
[FreeTextEntry1] : 1.  Most likely postpartum thyroiditis.\par Possible causes of hyperthyroidism, including Graves’ disease, toxic MNG, and subacute thyroiditis as well as labs/tests needed for proper diagnosis, were reviewed with the patient. \par - Repeat an extended thyroid panel, ESR\par -  We discussed in details a current approach to management of the thyroid postpartum thyroiditis, typical course including a hyper and hypothyroid phases.  She is clinically improving and can continue with nonsteroidal anti-inflammatory on as-needed basis. If in a hypothyroid phase, might use a small dose of synthroid to help with milk production\par \par 2. Right thyroid nodule\par We discussed that nodules that are seen in acute thyroiditis phase frequently resolve or shrink in size once thyroiditis improves.  I would not proceed with biopsy at this point, and just monitor sonographically.  I am planning to repeat another thyroid ultrasound in 10/22\par \par RTC 2 months (labs in 1 month)

## 2022-05-18 NOTE — HISTORY OF PRESENT ILLNESS
[FreeTextEntry1] : 36 year female f/u for multiple medical issues\par \par *** May 18, 2022 ***\par \par feels a bit better overall, gained about 2-3 lbs. Still breastfeeding, but milk production dropped drastically\par no anxiety, tingling sensation is better overall\par blood pressure is well controlled\par \par HPI:\par Ms. JACOBS  denies prior history of thyroid disorders. She had her 3rd baby in 11/21 and about 2 months later she started having a severe neck pain radiating to the right ear. (+) facial flushing, lost about 20 lbs, but she's still breastfeeding, hands and left tingling sensation. She was on Nifedipine for elevated blood pressure at that time, and was attributing her symptoms to a medication side effects.  She eventually saw an ENT at that time, who did not find any signs of infection. However he ran her thyroid functions, that were normal, and did a thyroid sonogram.\par Thyroid US (3/7/2022)–heterogeneous thyroid.  Ill-defined irregular hypoechoic heterogeneity in the right midpole measuring 1.4 x 0.6 cm\par Labs from April 7, 2022–TSH–0.086, free T4-1.66\par From 3/3/2022–TSH 0.618, free T4-1.47\par She saw Dr. Olson, and reportedly her neurological work up was fine, including MRI of the brain and neck/spine. She also Dr. Gonzalez for her tachycardia.  Transthoracic echo from March 14, 2022 is normal\par By now her neck and ear pain have subsided significantly.  This is her third pregnancy, and she did not have any thyroid issues during the first two.\par She has Mirena IUD- placed in January. She's taking Magnesium OTC and no other supplements. \par Denies family h/o thyroid cancer or history of radiation exposure to head and neck area in a childhood. Her mother has hypothyroidism. \par

## 2022-05-20 LAB
ERYTHROCYTE [SEDIMENTATION RATE] IN BLOOD BY WESTERGREN METHOD: 10 MM/HR
T3 SERPL-MCNC: 108 NG/DL
T4 FREE SERPL-MCNC: 1.4 NG/DL
THYROGLOB AB SERPL-ACNC: <20 IU/ML
THYROPEROXIDASE AB SERPL IA-ACNC: <10 IU/ML
TSH SERPL-ACNC: 0.55 UIU/ML

## 2022-05-31 ENCOUNTER — APPOINTMENT (OUTPATIENT)
Dept: ENDOCRINOLOGY | Facility: CLINIC | Age: 37
End: 2022-05-31

## 2022-05-31 NOTE — HISTORY OF PRESENT ILLNESS
[FreeTextEntry1] : Ms. JACOBS  is a 36 year  old female  who presents for initial endocrine evaluation. She presents with regard to a history of hyperthyroidism and a nodular thyroid. \par \par 5/18/2022 TSH returned at 0.55, free T4 was 1.4, total T3 was 108\par \par Additional medical history includes that of  .

## 2022-07-18 ENCOUNTER — APPOINTMENT (OUTPATIENT)
Dept: ENDOCRINOLOGY | Facility: CLINIC | Age: 37
End: 2022-07-18

## 2022-07-18 VITALS
HEIGHT: 67 IN | HEART RATE: 87 BPM | DIASTOLIC BLOOD PRESSURE: 84 MMHG | WEIGHT: 130.44 LBS | BODY MASS INDEX: 20.47 KG/M2 | SYSTOLIC BLOOD PRESSURE: 120 MMHG | OXYGEN SATURATION: 98 % | TEMPERATURE: 97.2 F

## 2022-07-18 DIAGNOSIS — E05.90 THYROTOXICOSIS, UNSPECIFIED W/OUT THYROTOXIC CRISIS OR STORM: ICD-10-CM

## 2022-07-18 PROCEDURE — 36415 COLL VENOUS BLD VENIPUNCTURE: CPT

## 2022-07-18 PROCEDURE — 99214 OFFICE O/P EST MOD 30 MIN: CPT | Mod: 25

## 2022-07-18 NOTE — ASSESSMENT
[FreeTextEntry1] : 1.  Most likely postpartum thyroiditis.\par Possible causes of hyperthyroidism, including Graves’ disease, toxic MNG, and subacute thyroiditis as well as labs/tests needed for proper diagnosis, were reviewed with the patient. \par - Repeat an extended thyroid panel, ESR\par -  We discussed in details a current approach to management of the thyroid postpartum thyroiditis, typical course including a hyper and hypothyroid phases.  She is clinically improving and can continue with nonsteroidal anti-inflammatory on as-needed basis. If in a hypothyroid phase, might use a small dose of Synthroid to help with milk production\par \par 2. Right thyroid nodule\par We discussed that nodules that are seen in acute thyroiditis phase frequently resolve or shrink in size once thyroiditis improves.  I would not proceed with biopsy at this point, and just monitor sonographically.  I am planning to repeat another thyroid ultrasound in 10/22\par \par RTC 2-3 months

## 2022-07-18 NOTE — HISTORY OF PRESENT ILLNESS
[FreeTextEntry1] : 36 year female f/u for multiple medical issues\par \par *** Jul 18, 2022 ***\par \par feels better overall. weight is stable. still breastfeeding- milk supply has increased\par sleeps less, tingling is much better but still comes and goes (definitely not as severe)\par anxiety is not worse. no more neck pain\par no recent labs\par \par *** May 18, 2022 ***\par \par feels a bit better overall, gained about 2-3 lbs. Still breastfeeding, but milk production dropped drastically\par no anxiety, tingling sensation is better overall\par blood pressure is well controlled\par \par HPI:\par Ms. JACOBS  denies prior history of thyroid disorders. She had her 3rd baby in 11/21 and about 2 months later she started having a severe neck pain radiating to the right ear. (+) facial flushing, lost about 20 lbs, but she's still breastfeeding, hands and left tingling sensation. She was on Nifedipine for elevated blood pressure at that time, and was attributing her symptoms to a medication side effects.  She eventually saw an ENT at that time, who did not find any signs of infection. However he ran her thyroid functions, that were normal, and did a thyroid sonogram.\par Thyroid US (3/7/2022)–heterogeneous thyroid.  Ill-defined irregular hypoechoic heterogeneity in the right midpole measuring 1.4 x 0.6 cm\par Labs from April 7, 2022–TSH–0.086, free T4-1.66\par From 3/3/2022–TSH 0.618, free T4-1.47\par She saw Dr. Olson, and reportedly her neurological work up was fine, including MRI of the brain and neck/spine. She also Dr. Gonzalez for her tachycardia.  Transthoracic echo from March 14, 2022 is normal\par By now her neck and ear pain have subsided significantly.  This is her third pregnancy, and she did not have any thyroid issues during the first two.\par She has Mirena IUD- placed in January. She's taking Magnesium OTC and no other supplements. \par Denies family h/o thyroid cancer or history of radiation exposure to head and neck area in a childhood. Her mother has hypothyroidism. \par

## 2022-07-20 LAB
ALBUMIN SERPL ELPH-MCNC: 4.8 G/DL
ALP BLD-CCNC: 95 U/L
ALT SERPL-CCNC: 18 U/L
ANION GAP SERPL CALC-SCNC: 11 MMOL/L
AST SERPL-CCNC: 19 U/L
BASOPHILS # BLD AUTO: 0.03 K/UL
BASOPHILS NFR BLD AUTO: 0.6 %
BILIRUB SERPL-MCNC: 0.3 MG/DL
BUN SERPL-MCNC: 12 MG/DL
CALCIUM SERPL-MCNC: 10.2 MG/DL
CHLORIDE SERPL-SCNC: 103 MMOL/L
CO2 SERPL-SCNC: 26 MMOL/L
CREAT SERPL-MCNC: 0.79 MG/DL
CRP SERPL-MCNC: <3 MG/L
EGFR: 101 ML/MIN/1.73M2
EOSINOPHIL # BLD AUTO: 0.05 K/UL
EOSINOPHIL NFR BLD AUTO: 1 %
ERYTHROCYTE [SEDIMENTATION RATE] IN BLOOD BY WESTERGREN METHOD: 3 MM/HR
GLUCOSE SERPL-MCNC: 85 MG/DL
HCT VFR BLD CALC: 46.5 %
HGB BLD-MCNC: 14.3 G/DL
IMM GRANULOCYTES NFR BLD AUTO: 0.2 %
LYMPHOCYTES # BLD AUTO: 1.83 K/UL
LYMPHOCYTES NFR BLD AUTO: 34.9 %
MAN DIFF?: NORMAL
MCHC RBC-ENTMCNC: 28.9 PG
MCHC RBC-ENTMCNC: 30.8 GM/DL
MCV RBC AUTO: 93.9 FL
MONOCYTES # BLD AUTO: 0.46 K/UL
MONOCYTES NFR BLD AUTO: 8.8 %
NEUTROPHILS # BLD AUTO: 2.87 K/UL
NEUTROPHILS NFR BLD AUTO: 54.5 %
PLATELET # BLD AUTO: 334 K/UL
POTASSIUM SERPL-SCNC: 5.1 MMOL/L
PROT SERPL-MCNC: 7.6 G/DL
RBC # BLD: 4.95 M/UL
RBC # FLD: 13.7 %
SODIUM SERPL-SCNC: 141 MMOL/L
T3 SERPL-MCNC: 100 NG/DL
T4 FREE SERPL-MCNC: 1.4 NG/DL
TSH SERPL-ACNC: 0.98 UIU/ML
TSI ACT/NOR SER: <0.1 IU/L
WBC # FLD AUTO: 5.25 K/UL

## 2022-07-22 LAB — TSH RECEPTOR AB: <1.1 IU/L

## 2022-08-25 ENCOUNTER — NON-APPOINTMENT (OUTPATIENT)
Age: 37
End: 2022-08-25

## 2022-09-20 ENCOUNTER — APPOINTMENT (OUTPATIENT)
Dept: ENDOCRINOLOGY | Facility: CLINIC | Age: 37
End: 2022-09-20

## 2022-09-20 VITALS
DIASTOLIC BLOOD PRESSURE: 70 MMHG | OXYGEN SATURATION: 98 % | HEART RATE: 79 BPM | HEIGHT: 67 IN | WEIGHT: 133 LBS | TEMPERATURE: 98 F | SYSTOLIC BLOOD PRESSURE: 110 MMHG | BODY MASS INDEX: 20.88 KG/M2 | RESPIRATION RATE: 16 BRPM

## 2022-09-20 PROCEDURE — 36415 COLL VENOUS BLD VENIPUNCTURE: CPT

## 2022-09-20 PROCEDURE — 99214 OFFICE O/P EST MOD 30 MIN: CPT | Mod: 25

## 2022-09-20 NOTE — HISTORY OF PRESENT ILLNESS
[FreeTextEntry1] : 36 year female f/u for multiple medical issues\par \par *** Sep 20, 2022 ***\par \par feels great, no new c/o. no anxiety/ neck pain, no palpitations\par last TSH- 0.98\par scheduled for a thyroid sono next month\par \par *** Jul 18, 2022 ***\par \par feels better overall. weight is stable. still breastfeeding- milk supply has increased\par sleeps less, tingling is much better but still comes and goes (definitely not as severe)\par anxiety is not worse. no more neck pain\par no recent labs\par \par *** May 18, 2022 ***\par \par feels a bit better overall, gained about 2-3 lbs. Still breastfeeding, but milk production dropped drastically\par no anxiety, tingling sensation is better overall\par blood pressure is well controlled\par \par HPI:\par Ms. JACOBS  denies prior history of thyroid disorders. She had her 3rd baby in 11/21 and about 2 months later she started having a severe neck pain radiating to the right ear. (+) facial flushing, lost about 20 lbs, but she's still breastfeeding, hands and left tingling sensation. She was on Nifedipine for elevated blood pressure at that time, and was attributing her symptoms to a medication side effects.  She eventually saw an ENT at that time, who did not find any signs of infection. However he ran her thyroid functions, that were normal, and did a thyroid sonogram.\par Thyroid US (3/7/2022)–heterogeneous thyroid.  Ill-defined irregular hypoechoic heterogeneity in the right midpole measuring 1.4 x 0.6 cm\par Labs from April 7, 2022–TSH–0.086, free T4-1.66\par From 3/3/2022–TSH 0.618, free T4-1.47\par She saw Dr. Olson, and reportedly her neurological work up was fine, including MRI of the brain and neck/spine. She also Dr. Gonzalez for her tachycardia.  Transthoracic echo from March 14, 2022 is normal\par By now her neck and ear pain have subsided significantly.  This is her third pregnancy, and she did not have any thyroid issues during the first two.\par She has Mirena IUD- placed in January. She's taking Magnesium OTC and no other supplements. \par Denies family h/o thyroid cancer or history of radiation exposure to head and neck area in a childhood. Her mother has hypothyroidism. \par

## 2022-09-20 NOTE — ASSESSMENT
[FreeTextEntry1] : 1.  Most likely h/o postpartum thyroiditis.\par Possible causes of hyperthyroidism, including Graves’ disease, toxic MNG, and subacute thyroiditis as well as labs/tests needed for proper diagnosis, were reviewed with the patient. \par - Repeat thyroid panel\par -  We discussed in details a current approach to management of the thyroid postpartum thyroiditis, typical course including a hyper and hypothyroid phases.  She is clinically improving and can continue with nonsteroidal anti-inflammatory on as-needed basis. If in a hypothyroid phase, might use a small dose of Synthroid to help with milk production\par \par 2. Right thyroid nodule\par We discussed that nodules that are seen in acute thyroiditis phase frequently resolve or shrink in size once thyroiditis improves.  I would not proceed with biopsy at this point, and just monitor sonographically.  I am planning to repeat thyroid ultrasound in 10/22 at [facility]\par \par RTC 6 months, or sooner prn

## 2022-09-21 ENCOUNTER — TRANSCRIPTION ENCOUNTER (OUTPATIENT)
Age: 37
End: 2022-09-21

## 2022-09-21 LAB
ALBUMIN SERPL ELPH-MCNC: 5.2 G/DL
ALP BLD-CCNC: 101 U/L
ALT SERPL-CCNC: 19 U/L
AST SERPL-CCNC: 19 U/L
BASOPHILS # BLD AUTO: 0.04 K/UL
BASOPHILS NFR BLD AUTO: 0.7 %
BILIRUB DIRECT SERPL-MCNC: 0.1 MG/DL
BILIRUB INDIRECT SERPL-MCNC: 0.2 MG/DL
BILIRUB SERPL-MCNC: 0.3 MG/DL
EOSINOPHIL # BLD AUTO: 0.07 K/UL
EOSINOPHIL NFR BLD AUTO: 1.2 %
HCT VFR BLD CALC: 45.1 %
HGB BLD-MCNC: 14.5 G/DL
IMM GRANULOCYTES NFR BLD AUTO: 0.3 %
LYMPHOCYTES # BLD AUTO: 1.98 K/UL
LYMPHOCYTES NFR BLD AUTO: 32.8 %
MAN DIFF?: NORMAL
MCHC RBC-ENTMCNC: 29.8 PG
MCHC RBC-ENTMCNC: 32.2 GM/DL
MCV RBC AUTO: 92.6 FL
MONOCYTES # BLD AUTO: 0.47 K/UL
MONOCYTES NFR BLD AUTO: 7.8 %
NEUTROPHILS # BLD AUTO: 3.46 K/UL
NEUTROPHILS NFR BLD AUTO: 57.2 %
PLATELET # BLD AUTO: 332 K/UL
PROT SERPL-MCNC: 7.6 G/DL
RBC # BLD: 4.87 M/UL
RBC # FLD: 13.2 %
T3 SERPL-MCNC: 109 NG/DL
T4 FREE SERPL-MCNC: 1.3 NG/DL
THYROGLOB AB SERPL-ACNC: <20 IU/ML
THYROPEROXIDASE AB SERPL IA-ACNC: <10 IU/ML
TSH SERPL-ACNC: 1.13 UIU/ML
WBC # FLD AUTO: 6.04 K/UL

## 2023-02-08 ENCOUNTER — NON-APPOINTMENT (OUTPATIENT)
Age: 38
End: 2023-02-08

## 2023-03-29 ENCOUNTER — APPOINTMENT (OUTPATIENT)
Dept: ENDOCRINOLOGY | Facility: CLINIC | Age: 38
End: 2023-03-29
Payer: COMMERCIAL

## 2023-03-29 VITALS
WEIGHT: 135.56 LBS | HEIGHT: 67 IN | BODY MASS INDEX: 21.28 KG/M2 | HEART RATE: 77 BPM | TEMPERATURE: 94.5 F | RESPIRATION RATE: 16 BRPM | OXYGEN SATURATION: 99 % | DIASTOLIC BLOOD PRESSURE: 69 MMHG | SYSTOLIC BLOOD PRESSURE: 112 MMHG

## 2023-03-29 DIAGNOSIS — Z86.39 PERSONAL HISTORY OF OTHER ENDOCRINE, NUTRITIONAL AND METABOLIC DISEASE: ICD-10-CM

## 2023-03-29 PROCEDURE — 99214 OFFICE O/P EST MOD 30 MIN: CPT | Mod: 25

## 2023-03-29 PROCEDURE — 36415 COLL VENOUS BLD VENIPUNCTURE: CPT

## 2023-03-29 NOTE — HISTORY OF PRESENT ILLNESS
[FreeTextEntry1] : 37 year female f/u for multiple medical issues\par \par *** Mar 29, 2023 ***\par \par feels well, occasional migraines but believed could be cycle related.  No more paresthesias.  No heart racing/anxiety\par weaning from a breastfeeding\par \par Thyr US (10/26/22)-heterogeneous thyroid gland.  No nodule is seen in the right thyroid lobe.  There is a 3 mm hypoechoic nodule with punctate single calcification in the left lobe.\par \par *** Sep 20, 2022 ***\par \par feels great, no new c/o. no anxiety/ neck pain, no palpitations\par last TSH- 0.98\par scheduled for a thyroid sono next month\par \par *** Jul 18, 2022 ***\par \par feels better overall. weight is stable. still breastfeeding- milk supply has increased\par sleeps less, tingling is much better but still comes and goes (definitely not as severe)\par anxiety is not worse. no more neck pain\par no recent labs\par \par *** May 18, 2022 ***\par \par feels a bit better overall, gained about 2-3 lbs. Still breastfeeding, but milk production dropped drastically\par no anxiety, tingling sensation is better overall\par blood pressure is well controlled\par \par HPI:\par Ms. JACOBS  denies prior history of thyroid disorders. She had her 3rd baby in 11/21 and about 2 months later she started having a severe neck pain radiating to the right ear. (+) facial flushing, lost about 20 lbs, but she's still breastfeeding, hands and left tingling sensation. She was on Nifedipine for elevated blood pressure at that time, and was attributing her symptoms to a medication side effects.  She eventually saw an ENT at that time, who did not find any signs of infection. However he ran her thyroid functions, that were normal, and did a thyroid sonogram.\par Thyroid US (3/7/2022)–heterogeneous thyroid.  Ill-defined irregular hypoechoic heterogeneity in the right midpole measuring 1.4 x 0.6 cm\par Labs from April 7, 2022–TSH–0.086, free T4-1.66\par From 3/3/2022–TSH 0.618, free T4-1.47\par She saw Dr. Olson, and reportedly her neurological work up was fine, including MRI of the brain and neck/spine. She also Dr. Gonzalez for her tachycardia.  Transthoracic echo from March 14, 2022 is normal\par By now her neck and ear pain have subsided significantly.  This is her third pregnancy, and she did not have any thyroid issues during the first two.\par She has Mirena IUD- placed in January. She's taking Magnesium OTC and no other supplements. \par Denies family h/o thyroid cancer or history of radiation exposure to head and neck area in a childhood. Her mother has hypothyroidism. \par

## 2023-03-29 NOTE — ASSESSMENT
[FreeTextEntry1] : 1.  H/o postpartum thyroiditis.\par Possible causes of hyperthyroidism, including Graves’ disease, toxic MNG, and subacute thyroiditis as well as labs/tests needed for proper diagnosis, were reviewed with the patient. \par - Repeat thyroid panel\par -If levels are stable, will continue with monitoring and conservative management\par \par 2.  History of a right thyroid nodule.\par \par We discussed that nodules that are seen in acute thyroiditis phase frequently resolve or shrink in size once thyroiditis improves.  This nodule is no longer seen as expected with resolution of thyroiditis.  There is a new tiny subcentimeter left-sided thyroid nodule; I am planning to repeat thyroid ultrasound in 10/23 at [facility]\par \par RTC 6 months, or sooner prn

## 2023-03-30 LAB
T3 SERPL-MCNC: 113 NG/DL
T4 FREE SERPL-MCNC: 1.4 NG/DL
THYROGLOB AB SERPL-ACNC: <20 IU/ML
THYROPEROXIDASE AB SERPL IA-ACNC: <10 IU/ML
TSH SERPL-ACNC: 1.3 UIU/ML

## 2023-08-16 NOTE — OB RN DELIVERY SUMMARY - NSDELIVERYTYPEA_OBGYN_ALL_OB
Problem: Skin/Tissue Integrity  Goal: Absence of new skin breakdown  Description: 1. Monitor for areas of redness and/or skin breakdown  2. Assess vascular access sites hourly  3. Every 4-6 hours minimum:  Change oxygen saturation probe site  4. Every 4-6 hours:  If on nasal continuous positive airway pressure, respiratory therapy assess nares and determine need for appliance change or resting period.   Outcome: Progressing     Problem: Pain  Goal: Verbalizes/displays adequate comfort level or baseline comfort level  Outcome: Progressing     Problem: Skin/Tissue Integrity - Adult  Goal: Skin integrity remains intact  Outcome: Progressing  Goal: Incisions, wounds, or drain sites healing without S/S of infection  Outcome: Progressing     Problem: Gastrointestinal - Adult  Goal: Minimal or absence of nausea and vomiting  Outcome: Progressing  Goal: Maintains or returns to baseline bowel function  Outcome: Progressing  Goal: Maintains adequate nutritional intake  Outcome: Progressing Vaginal Delivery

## 2023-09-11 ENCOUNTER — NON-APPOINTMENT (OUTPATIENT)
Age: 38
End: 2023-09-11

## 2023-09-28 ENCOUNTER — LABORATORY RESULT (OUTPATIENT)
Age: 38
End: 2023-09-28

## 2023-09-28 ENCOUNTER — APPOINTMENT (OUTPATIENT)
Dept: ENDOCRINOLOGY | Facility: CLINIC | Age: 38
End: 2023-09-28
Payer: COMMERCIAL

## 2023-09-28 VITALS
RESPIRATION RATE: 16 BRPM | HEIGHT: 67 IN | WEIGHT: 126 LBS | SYSTOLIC BLOOD PRESSURE: 112 MMHG | OXYGEN SATURATION: 99 % | HEART RATE: 61 BPM | DIASTOLIC BLOOD PRESSURE: 62 MMHG | TEMPERATURE: 97.6 F | BODY MASS INDEX: 19.78 KG/M2

## 2023-09-28 DIAGNOSIS — B37.0 CANDIDAL STOMATITIS: ICD-10-CM

## 2023-09-28 DIAGNOSIS — R53.83 OTHER MALAISE: ICD-10-CM

## 2023-09-28 DIAGNOSIS — R53.81 OTHER MALAISE: ICD-10-CM

## 2023-09-28 DIAGNOSIS — E04.1 NONTOXIC SINGLE THYROID NODULE: ICD-10-CM

## 2023-09-28 PROCEDURE — 99214 OFFICE O/P EST MOD 30 MIN: CPT | Mod: 25

## 2023-09-28 PROCEDURE — 36415 COLL VENOUS BLD VENIPUNCTURE: CPT

## 2023-09-29 LAB
25(OH)D3 SERPL-MCNC: 33.8 NG/ML
ANION GAP SERPL CALC-SCNC: 10 MMOL/L
BUN SERPL-MCNC: 9 MG/DL
CALCIUM SERPL-MCNC: 9.8 MG/DL
CHLORIDE SERPL-SCNC: 105 MMOL/L
CMV IGG SERPL QL: 0.36 U/ML
CMV IGG SERPL-IMP: NEGATIVE
CMV IGM SERPL QL: <8 AU/ML
CMV IGM SERPL QL: NEGATIVE
CO2 SERPL-SCNC: 24 MMOL/L
CREAT SERPL-MCNC: 0.75 MG/DL
DEPRECATED KAPPA LC FREE/LAMBDA SER: 1.55 RATIO
EBV EA AB SER IA-ACNC: 17.5 U/ML
EBV EA AB TITR SER IF: POSITIVE
EBV EA IGG SER QL IA: >600 U/ML
EBV EA IGG SER-ACNC: POSITIVE
EBV EA IGM SER IA-ACNC: NEGATIVE
EBV PATRN SPEC IB-IMP: NORMAL
EBV VCA IGG SER IA-ACNC: 201 U/ML
EBV VCA IGM SER QL IA: <10 U/ML
EGFR: 106 ML/MIN/1.73M2
EPSTEIN-BARR VIRUS CAPSID ANTIGEN IGG: POSITIVE
ESTIMATED AVERAGE GLUCOSE: 103 MG/DL
GLUCOSE SERPL-MCNC: 96 MG/DL
HBA1C MFR BLD HPLC: 5.2 %
HIV1+2 AB SPEC QL IA.RAPID: NONREACTIVE
IGA SER QL IEP: 337 MG/DL
IGG SER QL IEP: 1260 MG/DL
IGM SER QL IEP: 139 MG/DL
KAPPA LC CSF-MCNC: 1.55 MG/DL
KAPPA LC SERPL-MCNC: 2.41 MG/DL
POTASSIUM SERPL-SCNC: 4.9 MMOL/L
SODIUM SERPL-SCNC: 139 MMOL/L
T3 SERPL-MCNC: 128 NG/DL
T4 FREE SERPL-MCNC: 1.5 NG/DL
TSH SERPL-ACNC: 0.54 UIU/ML

## 2023-10-05 ENCOUNTER — TRANSCRIPTION ENCOUNTER (OUTPATIENT)
Age: 38
End: 2023-10-05

## 2023-10-11 ENCOUNTER — APPOINTMENT (OUTPATIENT)
Dept: ENDOCRINOLOGY | Facility: CLINIC | Age: 38
End: 2023-10-11

## 2023-11-28 ENCOUNTER — APPOINTMENT (OUTPATIENT)
Dept: RADIOLOGY | Facility: HOSPITAL | Age: 38
End: 2023-11-28

## 2023-12-08 ENCOUNTER — NON-APPOINTMENT (OUTPATIENT)
Age: 38
End: 2023-12-08

## 2023-12-10 ENCOUNTER — NON-APPOINTMENT (OUTPATIENT)
Age: 38
End: 2023-12-10

## 2024-01-06 ENCOUNTER — NON-APPOINTMENT (OUTPATIENT)
Age: 39
End: 2024-01-06

## 2024-01-12 ENCOUNTER — APPOINTMENT (OUTPATIENT)
Dept: CARDIOLOGY | Facility: CLINIC | Age: 39
End: 2024-01-12
Payer: COMMERCIAL

## 2024-01-12 ENCOUNTER — NON-APPOINTMENT (OUTPATIENT)
Age: 39
End: 2024-01-12

## 2024-01-12 VITALS
HEIGHT: 67 IN | WEIGHT: 120 LBS | BODY MASS INDEX: 18.83 KG/M2 | SYSTOLIC BLOOD PRESSURE: 131 MMHG | HEART RATE: 66 BPM | OXYGEN SATURATION: 98 % | DIASTOLIC BLOOD PRESSURE: 84 MMHG

## 2024-01-12 VITALS — DIASTOLIC BLOOD PRESSURE: 75 MMHG | SYSTOLIC BLOOD PRESSURE: 120 MMHG

## 2024-01-12 PROCEDURE — 93000 ELECTROCARDIOGRAM COMPLETE: CPT

## 2024-01-12 PROCEDURE — 99213 OFFICE O/P EST LOW 20 MIN: CPT | Mod: 25

## 2024-01-12 NOTE — HISTORY OF PRESENT ILLNESS
[FreeTextEntry1] : 38F who presents for evaluation of CP, possible pericarditis  Ongoing cough for last 2 months Was put on steroids/abx 1 month ago for presumed PNA/bronchitis. Negative RVP  CT chest grossly negative Cough improving, but subsequently developed chest tightness, dyspnea, heart racing, worse when lying flat Went to [facility], negative blood work, ECG with nonspecific changes Presumed pericarditis. TTE normal, trop, D-Dimer negative  Taking Motrin q6 hours without much relief   HISTORY:  Hx of postpartum HTN after birth of 3rd child Subsequently resolved, off all medications  COVID in July 2021 Booster in Jan 2022  Never smoker. Both parents have HTN, some heart disease in grandparents. Special   ECG: SR with nonspecific ST changes

## 2024-01-12 NOTE — DISCUSSION/SUMMARY
[FreeTextEntry1] : Suspected viral illness with resultant postviral inflammatory symptoms  Possible pericarditis although ECG with slight ST changes as compared to prior, not consistent with pericarditis.  Mildly elevated CRP from hospital.  TTE from hospital within normal limits.  Negative troponin, D-dimer.  Symptoms seem to be improving per patient.  Continue NSAIDs around-the-clock x 2 weeks. Consider colchicine if no improvement in symptoms Patient will be in touch should her symptoms not resolve or worsen [EKG obtained to assist in diagnosis and management of assessed problem(s)] : EKG obtained to assist in diagnosis and management of assessed problem(s)

## 2024-01-24 ENCOUNTER — TRANSCRIPTION ENCOUNTER (OUTPATIENT)
Age: 39
End: 2024-01-24

## 2024-01-26 ENCOUNTER — TRANSCRIPTION ENCOUNTER (OUTPATIENT)
Age: 39
End: 2024-01-26

## 2024-01-26 ENCOUNTER — NON-APPOINTMENT (OUTPATIENT)
Age: 39
End: 2024-01-26

## 2024-01-26 LAB
CRP SERPL-MCNC: <3 MG/L
ERYTHROCYTE [SEDIMENTATION RATE] IN BLOOD BY WESTERGREN METHOD: 2 MM/HR
T3 SERPL-MCNC: 102 NG/DL
T4 FREE SERPL-MCNC: 1.3 NG/DL
TSH SERPL-ACNC: 1.34 UIU/ML

## 2024-01-28 LAB
TSH RECEPTOR AB: <1.1 IU/L
TSI ACT/NOR SER: <0.1 IU/L

## 2024-01-29 ENCOUNTER — TRANSCRIPTION ENCOUNTER (OUTPATIENT)
Age: 39
End: 2024-01-29

## 2024-02-01 ENCOUNTER — APPOINTMENT (OUTPATIENT)
Dept: ULTRASOUND IMAGING | Facility: CLINIC | Age: 39
End: 2024-02-01
Payer: COMMERCIAL

## 2024-02-01 ENCOUNTER — OUTPATIENT (OUTPATIENT)
Dept: OUTPATIENT SERVICES | Facility: HOSPITAL | Age: 39
LOS: 1 days | End: 2024-02-01
Payer: COMMERCIAL

## 2024-02-01 DIAGNOSIS — Z98.890 OTHER SPECIFIED POSTPROCEDURAL STATES: Chronic | ICD-10-CM

## 2024-02-01 DIAGNOSIS — R07.89 OTHER CHEST PAIN: ICD-10-CM

## 2024-02-01 DIAGNOSIS — Z90.49 ACQUIRED ABSENCE OF OTHER SPECIFIED PARTS OF DIGESTIVE TRACT: Chronic | ICD-10-CM

## 2024-02-01 PROCEDURE — 76700 US EXAM ABDOM COMPLETE: CPT

## 2024-02-01 PROCEDURE — 76700 US EXAM ABDOM COMPLETE: CPT | Mod: 26

## 2024-02-05 ENCOUNTER — TRANSCRIPTION ENCOUNTER (OUTPATIENT)
Age: 39
End: 2024-02-05

## 2024-02-06 ENCOUNTER — TRANSCRIPTION ENCOUNTER (OUTPATIENT)
Age: 39
End: 2024-02-06

## 2024-02-06 NOTE — ED ADULT TRIAGE NOTE - WEIGHT IN LBS
Track calories for one week  To maintain weight your daily calories must be 1400  To gain weight your daily calories must be 1700  Please take the meloxicam, one pill as needed daily for pain  
130.9

## 2024-02-09 ENCOUNTER — APPOINTMENT (OUTPATIENT)
Dept: INTERNAL MEDICINE | Facility: CLINIC | Age: 39
End: 2024-02-09
Payer: COMMERCIAL

## 2024-02-09 PROCEDURE — 93306 TTE W/DOPPLER COMPLETE: CPT

## 2024-02-09 NOTE — H&P ADULT - NSHPLABSRESULTS_GEN_ALL_CORE
Patient is A&Ox4, room air, stand by assist. Continuing IV protonix. Pt went down for US of kidney/Bladder see chart for results. Strict I&O's & fluid restriction. Call light within reach and fall precautions in place.     Problem: Patient Centered Care  Goal: Patient preferences are identified and integrated in the patient's plan of care  Description: Interventions:  - What would you like us to know as we care for you? From home alone. Johnny is a retired   - Provide timely, complete, and accurate information to patient/family  - Incorporate patient and family knowledge, values, beliefs, and cultural backgrounds into the planning and delivery of care  - Encourage patient/family to participate in care and decision-making at the level they choose  - Honor patient and family perspectives and choices  Outcome: Progressing     Problem: Diabetes/Glucose Control  Goal: Glucose maintained within prescribed range  Description: INTERVENTIONS:  - Monitor Blood Glucose as ordered  - Assess for signs and symptoms of hyperglycemia and hypoglycemia  - Administer ordered medications to maintain glucose within target range  - Assess barriers to adequate nutritional intake and initiate nutrition consult as needed  - Instruct patient on self management of diabetes  Outcome: Progressing     Problem: CARDIOVASCULAR - ADULT  Goal: Maintains optimal cardiac output and hemodynamic stability  Description: INTERVENTIONS:  - Monitor vital signs, rhythm, and trends  - Monitor for bleeding, hypotension and signs of decreased cardiac output  - Evaluate effectiveness of vasoactive medications to optimize hemodynamic stability  - Monitor arterial and/or venous puncture sites for bleeding and/or hematoma  - Assess quality of pulses, skin color and temperature  - Assess for signs of decreased coronary artery perfusion - ex. Angina  - Evaluate fluid balance, assess for edema, trend weights  Outcome: Progressing  Goal: Absence of cardiac  arrhythmias or at baseline  Description: INTERVENTIONS:  - Continuous cardiac monitoring, monitor vital signs, obtain 12 lead EKG if indicated  - Evaluate effectiveness of antiarrhythmic and heart rate control medications as ordered  - Initiate emergency measures for life threatening arrhythmias  - Monitor electrolytes and administer replacement therapy as ordered  Outcome: Progressing     Problem: Patient/Family Goals  Goal: Patient/Family Long Term Goal  Description: Patient's Long Term Goal: \"to maintain my health so I don't have to be hospitalized\"    Interventions:  - Take medications as prescribed at discharge  - Follow up with PCP and cardiology   - Maintain a low Na, heart healthy diet   - See additional Care Plan goals for specific interventions  Outcome: Progressing  Goal: Patient/Family Short Term Goal  Description: Patient's Short Term Goal: \"to be less SOB and have less swelling in my legs\"    Interventions:   - IV bumex  - 2000 fluid restriction  - I&O  - Daily weights  - See additional Care Plan goals for specific interventions  Outcome: Progressing      13.5   10.18 )-----------( 423      ( 29 Jun 2020 08:24 )             40.5     06-29    137  |  106  |  10  ----------------------------<  143<H>  3.6   |  17<L>  |  0.64    Ca    8.9      29 Jun 2020 08:16    TPro  6.9  /  Alb  4.0  /  TBili  0.3  /  DBili  x   /  AST  23  /  ALT  13  /  AlkPhos  66  06-29    I&O's Detail    PT/INR - ( 29 Jun 2020 08:16 )   PT: 12.0 sec;   INR: 1.04 ratio         PTT - ( 29 Jun 2020 08:16 )  PTT:27.6 sec      Radiology: unable to perform due to patient clinically unstable

## 2024-03-04 ENCOUNTER — OFFICE (OUTPATIENT)
Dept: URBAN - METROPOLITAN AREA CLINIC 27 | Facility: CLINIC | Age: 39
Setting detail: OPHTHALMOLOGY
End: 2024-03-04
Payer: COMMERCIAL

## 2024-03-04 DIAGNOSIS — H16.423: ICD-10-CM

## 2024-03-04 DIAGNOSIS — D31.31: ICD-10-CM

## 2024-03-04 DIAGNOSIS — H52.13: ICD-10-CM

## 2024-03-04 DIAGNOSIS — H43.391: ICD-10-CM

## 2024-03-04 PROBLEM — H52.7 REFRACTIVE ERROR ; BOTH EYES: Status: ACTIVE | Noted: 2024-03-04

## 2024-03-04 PROCEDURE — 92250 FUNDUS PHOTOGRAPHY W/I&R: CPT | Performed by: OPHTHALMOLOGY

## 2024-03-04 PROCEDURE — 92004 COMPRE OPH EXAM NEW PT 1/>: CPT | Performed by: OPHTHALMOLOGY

## 2024-03-04 PROCEDURE — 92015 DETERMINE REFRACTIVE STATE: CPT | Performed by: OPHTHALMOLOGY

## 2024-03-04 ASSESSMENT — REFRACTION_CURRENTRX
OD_AXIS: 109
OS_AXIS: 079
OS_CYLINDER: +0.50
OS_OVR_VA: 20/
OD_OVR_VA: 20/
OD_CYLINDER: +0.75
OD_SPHERE: -3.75
OS_SPHERE: -3.50

## 2024-03-04 ASSESSMENT — REFRACTION_MANIFEST
OS_SPHERE: -3.50
OS_CYLINDER: +0.50
OD_CYLINDER: +0.75
OD_AXIS: 095
OD_SPHERE: -3.25
OS_VA1: 20/20
OS_AXIS: 060
OD_VA1: 20/20

## 2024-03-04 ASSESSMENT — SPHEQUIV_DERIVED
OS_SPHEQUIV: -3.25
OD_SPHEQUIV: -2.875

## 2024-03-11 ENCOUNTER — APPOINTMENT (OUTPATIENT)
Dept: PULMONOLOGY | Facility: CLINIC | Age: 39
End: 2024-03-11

## 2024-03-25 ENCOUNTER — APPOINTMENT (OUTPATIENT)
Dept: CARDIOLOGY | Facility: CLINIC | Age: 39
End: 2024-03-25

## 2024-04-12 ENCOUNTER — APPOINTMENT (OUTPATIENT)
Dept: CARDIOLOGY | Facility: CLINIC | Age: 39
End: 2024-04-12
Payer: COMMERCIAL

## 2024-04-12 ENCOUNTER — NON-APPOINTMENT (OUTPATIENT)
Age: 39
End: 2024-04-12

## 2024-04-12 VITALS — DIASTOLIC BLOOD PRESSURE: 70 MMHG | SYSTOLIC BLOOD PRESSURE: 94 MMHG

## 2024-04-12 VITALS
HEART RATE: 91 BPM | BODY MASS INDEX: 19.15 KG/M2 | SYSTOLIC BLOOD PRESSURE: 128 MMHG | DIASTOLIC BLOOD PRESSURE: 85 MMHG | WEIGHT: 122 LBS | TEMPERATURE: 98 F | HEIGHT: 67 IN | OXYGEN SATURATION: 97 %

## 2024-04-12 DIAGNOSIS — R00.2 PALPITATIONS: ICD-10-CM

## 2024-04-12 DIAGNOSIS — I30.9 ACUTE PERICARDITIS, UNSPECIFIED: ICD-10-CM

## 2024-04-12 DIAGNOSIS — R06.09 OTHER FORMS OF DYSPNEA: ICD-10-CM

## 2024-04-12 DIAGNOSIS — R07.89 OTHER CHEST PAIN: ICD-10-CM

## 2024-04-12 PROCEDURE — 93000 ELECTROCARDIOGRAM COMPLETE: CPT

## 2024-04-12 PROCEDURE — 99213 OFFICE O/P EST LOW 20 MIN: CPT | Mod: 25

## 2024-04-12 RX ORDER — COLCHICINE 0.6 MG/1
0.6 TABLET ORAL TWICE DAILY
Qty: 60 | Refills: 1 | Status: DISCONTINUED | COMMUNITY
Start: 2024-01-26 | End: 2024-04-12

## 2024-06-03 ENCOUNTER — TRANSCRIPTION ENCOUNTER (OUTPATIENT)
Age: 39
End: 2024-06-03

## 2024-06-03 ENCOUNTER — NON-APPOINTMENT (OUTPATIENT)
Age: 39
End: 2024-06-03

## 2024-06-03 NOTE — DISCUSSION/SUMMARY
[EKG obtained to assist in diagnosis and management of assessed problem(s)] : EKG obtained to assist in diagnosis and management of assessed problem(s) [FreeTextEntry1] : Suspected pericarditis in 1/2024 - symptoms much improved as per patient  RV 1Y or prn  Addendum 6/3/24: Pt pending EGD. No further cardiac symptoms. No lingering issues related to prior pericarditis. This patient is able to perform >4 METS of activity without limitation. No evidence of ongoing ischemia, arrhythmia, valvular heart disease or decompensated heart failure.  This patient is optimized from a cardiac standpoint for this low risk procedure.  No further cardiac testing is necessary prior to surgery.

## 2024-06-03 NOTE — HISTORY OF PRESENT ILLNESS
[FreeTextEntry1] : 38F who presents for follow up for presumed pericarditis in 1/2024  Symptoms much improved - occasional palpitations with rare chest tightness and SOB Presumed pericarditis- took NSAIDS, colchicine caused muscle weakness ECG today improved Pt feeling much better overall   HISTORY:  Hx of postpartum HTN after birth of 3rd child Subsequently resolved, off all medications  Ongoing cough a few month ago with subsequent chest tightness, dyspnea, and heart racing Went to [facility], negative blood work, ECG with nonspecific changes Presumed pericarditis. TTE normal, trop, D-Dimer negative   COVID in July 2021 Booster in Jan 2022  Never smoker. Both parents have HTN, some heart disease in grandparents. Special   ECG: SR with shortened SC  TTE 2/2024:  1. Left ventricular cavity is normal. Left ventricular wall thickness is normal. Left ventricular systolic function is normal with an ejection fraction of 67 % by Conway's method of disks. There are no regional wall motion abnormalities seen. 2. Normal left ventricular diastolic function, with normal filling pressure. 3. Normal right ventricular cavity size, wall thickness, and normal systolic function. Tricuspid annular plane systolic excursion (TAPSE) is 2.3 cm (normal >=1.7 cm). 4. The left atrium is normal. 5. No significant valvular disease.

## 2024-06-11 ENCOUNTER — APPOINTMENT (OUTPATIENT)
Dept: OBGYN | Facility: CLINIC | Age: 39
End: 2024-06-11

## 2024-06-12 ENCOUNTER — NON-APPOINTMENT (OUTPATIENT)
Age: 39
End: 2024-06-12

## 2024-06-13 ENCOUNTER — APPOINTMENT (OUTPATIENT)
Dept: ULTRASOUND IMAGING | Facility: CLINIC | Age: 39
End: 2024-06-13

## 2024-06-14 ENCOUNTER — APPOINTMENT (OUTPATIENT)
Dept: OBGYN | Facility: CLINIC | Age: 39
End: 2024-06-14

## 2024-06-18 ENCOUNTER — APPOINTMENT (OUTPATIENT)
Dept: OBGYN | Facility: CLINIC | Age: 39
End: 2024-06-18
Payer: COMMERCIAL

## 2024-06-18 VITALS
SYSTOLIC BLOOD PRESSURE: 125 MMHG | WEIGHT: 121 LBS | DIASTOLIC BLOOD PRESSURE: 78 MMHG | HEART RATE: 80 BPM | BODY MASS INDEX: 18.99 KG/M2 | HEIGHT: 67 IN

## 2024-06-18 DIAGNOSIS — N89.8 OTHER SPECIFIED NONINFLAMMATORY DISORDERS OF VAGINA: ICD-10-CM

## 2024-06-18 PROCEDURE — 99459 PELVIC EXAMINATION: CPT

## 2024-06-18 PROCEDURE — 99204 OFFICE O/P NEW MOD 45 MIN: CPT

## 2024-06-18 RX ORDER — OMEPRAZOLE 20 MG/1
20 TABLET, DELAYED RELEASE ORAL
Refills: 0 | Status: ACTIVE | COMMUNITY

## 2024-06-19 ENCOUNTER — NON-APPOINTMENT (OUTPATIENT)
Age: 39
End: 2024-06-19

## 2024-06-19 ENCOUNTER — APPOINTMENT (OUTPATIENT)
Dept: ALLERGY | Facility: CLINIC | Age: 39
End: 2024-06-19
Payer: COMMERCIAL

## 2024-06-19 VITALS
DIASTOLIC BLOOD PRESSURE: 64 MMHG | HEIGHT: 67 IN | WEIGHT: 121 LBS | OXYGEN SATURATION: 99 % | HEART RATE: 89 BPM | SYSTOLIC BLOOD PRESSURE: 118 MMHG | BODY MASS INDEX: 18.99 KG/M2 | TEMPERATURE: 98.5 F

## 2024-06-19 PROCEDURE — 99203 OFFICE O/P NEW LOW 30 MIN: CPT

## 2024-06-19 NOTE — SOCIAL HISTORY
[House] : [unfilled] lives in a house  [Radiator/Baseboard] : heating provided by radiator(s)/baseboard(s) [Window Units] : air conditioning provided by window units [None] : none [] :  [FreeTextEntry1] : Lives with spouse and 3 children  Homemaker - special   [Bedroom] : not in the bedroom [Basement] : not in the basement [Living Area] : not in the living area [Smokers in Household] : there are no smokers in the home

## 2024-06-19 NOTE — PHYSICAL EXAM
[Alert] : alert [Well Nourished] : well nourished [Healthy Appearance] : healthy appearance [No Acute Distress] : no acute distress [Well Developed] : well developed [Normal Voice/Communication] : normal voice communication [No Neck Mass] : no neck mass was observed [No LAD] : no lymphadenopathy [Normal Rate and Effort] : normal respiratory rhythm and effort [No Crackles] : no crackles [No Retractions] : no retractions [Wheezing] : no wheezing was heard [Normal Rate] : heart rate was normal  [Normal S1, S2] : normal S1 and S2 [Regular Rhythm] : with a regular rhythm [Normal Cervical Lymph Nodes] : cervical [No Cyanosis] : no cyanosis [Normal Mood] : mood was normal [Judgment and Insight Age Appropriate] : judgement and insight is age appropriate

## 2024-06-19 NOTE — HISTORY OF PRESENT ILLNESS
[de-identified] : Patient reports since March she noted rash on mons area - treated with oral antifungal and topical fungal cream and steroid cream - she then noted burning pain after use of combination antifungal/steroid cream.   She has seen many dermatologists - most recent she was seen and treated with topical steroid with no change.   She has tried different fabric underwear with no change in the symptoms.      Dermatologist recommended patch testing.   Skin is very sensitive - red and blotchy at times.   Patient removed IUD in April.   She is not trying to become pregnant.   No rash with above.

## 2024-06-19 NOTE — ASSESSMENT
[FreeTextEntry1] : Possible contact dermatitis resulting in recurrent pruritus of pubic area - stop all topical steroids and use Desitin only with zinc oxide and no other components.   RV patch testing

## 2024-06-20 ENCOUNTER — TRANSCRIPTION ENCOUNTER (OUTPATIENT)
Age: 39
End: 2024-06-20

## 2024-06-20 DIAGNOSIS — N76.0 ACUTE VAGINITIS: ICD-10-CM

## 2024-06-20 DIAGNOSIS — B96.89 ACUTE VAGINITIS: ICD-10-CM

## 2024-06-20 LAB
BV BACTERIA RRNA VAG QL NAA+PROBE: DETECTED
C GLABRATA RNA VAG QL NAA+PROBE: NOT DETECTED
C TRACH RRNA SPEC QL NAA+PROBE: NOT DETECTED
CANDIDA RRNA VAG QL PROBE: NOT DETECTED
N GONORRHOEA RRNA SPEC QL NAA+PROBE: NOT DETECTED
T VAGINALIS RRNA SPEC QL NAA+PROBE: NOT DETECTED

## 2024-06-20 RX ORDER — CLINDAMYCIN PHOSPHATE 20 MG/G
2 CREAM VAGINAL
Qty: 1 | Refills: 0 | Status: ACTIVE | COMMUNITY
Start: 2024-06-20 | End: 1900-01-01

## 2024-06-24 ENCOUNTER — APPOINTMENT (OUTPATIENT)
Dept: OBGYN | Facility: CLINIC | Age: 39
End: 2024-06-24

## 2024-06-27 NOTE — END OF VISIT
[FreeTextEntry3] : I, Farhana Rajesh, acted as a scribe on behalf of Dr. Marko Rodriguez on 06/18/2024 .  All medical entries made by the scribe were at my, Dr. Marko Rodriguez , direction and personally dictated by me on 06/18/2024 .. I have reviewed the chart and agree that the record accurately reflects my personal performance of the history, physical exam, assessment and plan. I have also personally directed, reviewed, and agreed with the chart.

## 2024-06-27 NOTE — HISTORY OF PRESENT ILLNESS
[Condoms] : uses condoms [Y] : Patient is sexually active [Monogamous (Male Partner)] : is monogamous with a male partner [FreeTextEntry1] : 38 year old P3 LMP 6/1/24 presents for annual exam.  she has complaints of pain, pressure, and swelling on the right side of the vagina. It initially started with a rash. The rash starts to swell occasionally. The swelling worsens when she stands. It worsens when she wears underwear. She tried to change the underwear fabric but offered no relief. She also tried sitz baths. She removed an IUD in April.  She also complains of dryness and hair thinning. She put in a tampon during her LMP which made the rash worse.  A culture came back BV + and was given Metronidazole cream that offered no relief. She was advised a steroid cream but has not started the treatment. She had a history of an episiotomy.  [PapSmeardate] : 10/23

## 2024-06-27 NOTE — PLAN
[FreeTextEntry1] : 38 year old presented with slight enterocele and vaginitis.   Vaginitis  - Cx sent  - Pt should RTO if vaginitis worsens   Enterocele  - Referral for PT sent

## 2024-06-27 NOTE — PHYSICAL EXAM
[Labia Majora] : normal [Labia Minora] : normal [Normal] : normal [Uterine Adnexae] : normal [Labia Majora Erythema Right] : erythema on the right [Chaperone Present] : A chaperone was present in the examining room during all aspects of the physical examination [[id number]] : A chaperone was present during the pelvic exam. [Appropriately responsive] : appropriately responsive [Alert] : alert [No Acute Distress] : no acute distress [No Lymphadenopathy] : no lymphadenopathy [Regular Rate Rhythm] : regular rate rhythm [No Murmurs] : no murmurs [Clear to Auscultation B/L] : clear to auscultation bilaterally [Soft] : soft [Non-tender] : non-tender [Non-distended] : non-distended [No HSM] : No HSM [No Lesions] : no lesions [No Mass] : no mass [Oriented x3] : oriented x3 [FreeTextEntry2] : right labia majora slightly inflamed and irritated   [FreeTextEntry4] : slight enterocele

## 2024-07-02 ENCOUNTER — APPOINTMENT (OUTPATIENT)
Dept: MRI IMAGING | Facility: IMAGING CENTER | Age: 39
End: 2024-07-02
Payer: COMMERCIAL

## 2024-07-02 ENCOUNTER — OUTPATIENT (OUTPATIENT)
Dept: OUTPATIENT SERVICES | Facility: HOSPITAL | Age: 39
LOS: 1 days | End: 2024-07-02
Payer: COMMERCIAL

## 2024-07-02 DIAGNOSIS — Z98.890 OTHER SPECIFIED POSTPROCEDURAL STATES: Chronic | ICD-10-CM

## 2024-07-02 DIAGNOSIS — Z90.49 ACQUIRED ABSENCE OF OTHER SPECIFIED PARTS OF DIGESTIVE TRACT: Chronic | ICD-10-CM

## 2024-07-02 DIAGNOSIS — Z00.8 ENCOUNTER FOR OTHER GENERAL EXAMINATION: ICD-10-CM

## 2024-07-02 PROCEDURE — 72195 MRI PELVIS W/O DYE: CPT | Mod: 26

## 2024-07-02 PROCEDURE — 72195 MRI PELVIS W/O DYE: CPT

## 2024-07-15 ENCOUNTER — APPOINTMENT (OUTPATIENT)
Dept: ALLERGY | Facility: CLINIC | Age: 39
End: 2024-07-15

## 2024-07-17 ENCOUNTER — APPOINTMENT (OUTPATIENT)
Dept: ALLERGY | Facility: CLINIC | Age: 39
End: 2024-07-17

## 2024-07-18 ENCOUNTER — APPOINTMENT (OUTPATIENT)
Dept: ALLERGY | Facility: CLINIC | Age: 39
End: 2024-07-18

## 2024-07-19 ENCOUNTER — APPOINTMENT (OUTPATIENT)
Dept: RADIOLOGY | Facility: HOSPITAL | Age: 39
End: 2024-07-19

## 2024-08-02 NOTE — PATIENT PROFILE OB - INFLUENZA IMMUNIZATION DATE:
A diet high in fiber, fresh fruits, vegetables, and lean meats, and low in saturated fats, fried foods, concentrated sweets, and refined carbohydrates is recommended. In addition to lifestyle modification I recommend follow up with your Primary Care Provider regarding increasing LDL levels.  
2017

## 2024-08-15 ENCOUNTER — APPOINTMENT (OUTPATIENT)
Dept: RADIOLOGY | Facility: HOSPITAL | Age: 39
End: 2024-08-15

## 2024-08-15 ENCOUNTER — APPOINTMENT (OUTPATIENT)
Dept: ORTHOPEDIC SURGERY | Facility: CLINIC | Age: 39
End: 2024-08-15

## 2024-08-15 PROCEDURE — 74221 X-RAY XM ESOPHAGUS 2CNTRST: CPT | Mod: 26

## 2024-09-20 ENCOUNTER — APPOINTMENT (OUTPATIENT)
Age: 39
End: 2024-09-20

## 2024-10-02 ENCOUNTER — OFFICE (OUTPATIENT)
Dept: URBAN - METROPOLITAN AREA CLINIC 27 | Facility: CLINIC | Age: 39
Setting detail: OPHTHALMOLOGY
End: 2024-10-02
Payer: COMMERCIAL

## 2024-10-02 DIAGNOSIS — H10.45: ICD-10-CM

## 2024-10-02 DIAGNOSIS — H16.423: ICD-10-CM

## 2024-10-02 PROCEDURE — 92012 INTRM OPH EXAM EST PATIENT: CPT | Performed by: OPHTHALMOLOGY

## 2024-10-02 ASSESSMENT — REFRACTION_MANIFEST
OS_AXIS: 060
OD_VA1: 20/20
OD_CYLINDER: +0.75
OS_CYLINDER: +0.50
OD_SPHERE: -3.25
OS_VA1: 20/20
OS_SPHERE: -3.50
OD_AXIS: 095

## 2024-10-02 ASSESSMENT — KERATOMETRY
OS_AXISANGLE_DEGREES: 091
OS_K2POWER_DIOPTERS: 45.50
OD_K2POWER_DIOPTERS: 44.75
OS_K1POWER_DIOPTERS: 44.50
OD_K1POWER_DIOPTERS: 43.50
METHOD_AUTO_MANUAL: AUTO
OD_AXISANGLE_DEGREES: 093

## 2024-10-02 ASSESSMENT — REFRACTION_CURRENTRX
OS_AXIS: 079
OD_OVR_VA: 20/
OS_SPHERE: -3.50
OS_OVR_VA: 20/
OS_CYLINDER: +0.50
OD_SPHERE: -3.75
OD_CYLINDER: +0.75
OD_AXIS: 109

## 2024-10-02 ASSESSMENT — REFRACTION_AUTOREFRACTION
OD_SPHERE: -3.50
OS_AXIS: 070
OD_CYLINDER: +0.50
OS_CYLINDER: +0.25
OS_SPHERE: -3.50
OD_AXIS: 105

## 2024-10-02 ASSESSMENT — VISUAL ACUITY
OD_BCVA: 20/20
OS_BCVA: 20/20

## 2024-10-02 ASSESSMENT — TONOMETRY
OD_IOP_MMHG: 16
OS_IOP_MMHG: 16

## 2024-10-02 ASSESSMENT — VASCULARIZATION
OD_VASCULARIZATION: PANNUS
OS_VASCULARIZATION: PANNUS

## 2024-10-08 ENCOUNTER — LABORATORY RESULT (OUTPATIENT)
Age: 39
End: 2024-10-08

## 2024-10-08 ENCOUNTER — APPOINTMENT (OUTPATIENT)
Dept: ENDOCRINOLOGY | Facility: CLINIC | Age: 39
End: 2024-10-08
Payer: COMMERCIAL

## 2024-10-08 VITALS
RESPIRATION RATE: 16 BRPM | OXYGEN SATURATION: 99 % | SYSTOLIC BLOOD PRESSURE: 118 MMHG | BODY MASS INDEX: 18.83 KG/M2 | HEART RATE: 88 BPM | HEIGHT: 67 IN | DIASTOLIC BLOOD PRESSURE: 80 MMHG | TEMPERATURE: 97.7 F | WEIGHT: 120 LBS

## 2024-10-08 DIAGNOSIS — R53.83 OTHER MALAISE: ICD-10-CM

## 2024-10-08 DIAGNOSIS — E04.1 NONTOXIC SINGLE THYROID NODULE: ICD-10-CM

## 2024-10-08 DIAGNOSIS — R53.81 OTHER MALAISE: ICD-10-CM

## 2024-10-08 PROCEDURE — 36415 COLL VENOUS BLD VENIPUNCTURE: CPT

## 2024-10-08 PROCEDURE — 99214 OFFICE O/P EST MOD 30 MIN: CPT

## 2024-10-09 ENCOUNTER — OFFICE (OUTPATIENT)
Age: 39
Setting detail: OPHTHALMOLOGY
End: 2024-10-09
Payer: COMMERCIAL

## 2024-10-09 ENCOUNTER — NON-APPOINTMENT (OUTPATIENT)
Age: 39
End: 2024-10-09

## 2024-10-09 DIAGNOSIS — H01.001: ICD-10-CM

## 2024-10-09 DIAGNOSIS — H10.45: ICD-10-CM

## 2024-10-09 DIAGNOSIS — H01.004: ICD-10-CM

## 2024-10-09 LAB
25(OH)D3 SERPL-MCNC: 37.3 NG/ML
ALBUMIN SERPL ELPH-MCNC: 4.9 G/DL
ALP BLD-CCNC: 53 U/L
ALT SERPL-CCNC: 14 U/L
ANION GAP SERPL CALC-SCNC: 17 MMOL/L
AST SERPL-CCNC: 17 U/L
BASOPHILS # BLD AUTO: 0.04 K/UL
BASOPHILS NFR BLD AUTO: 0.5 %
BILIRUB SERPL-MCNC: 0.5 MG/DL
BUN SERPL-MCNC: 12 MG/DL
CALCIUM SERPL-MCNC: 9.9 MG/DL
CELIACPAN: NORMAL
CHLORIDE SERPL-SCNC: 102 MMOL/L
CO2 SERPL-SCNC: 23 MMOL/L
CREAT SERPL-MCNC: 0.79 MG/DL
EGFR: 99 ML/MIN/1.73M2
EOSINOPHIL # BLD AUTO: 0.02 K/UL
EOSINOPHIL NFR BLD AUTO: 0.2 %
ESTIMATED AVERAGE GLUCOSE: 100 MG/DL
GLUCOSE SERPL-MCNC: 72 MG/DL
HBA1C MFR BLD HPLC: 5.1 %
HCT VFR BLD CALC: 43.8 %
HGB BLD-MCNC: 14 G/DL
IMM GRANULOCYTES NFR BLD AUTO: 0.2 %
LYMPHOCYTES # BLD AUTO: 1.37 K/UL
LYMPHOCYTES NFR BLD AUTO: 16.4 %
MAN DIFF?: NORMAL
MCHC RBC-ENTMCNC: 29.9 PG
MCHC RBC-ENTMCNC: 32 GM/DL
MCV RBC AUTO: 93.6 FL
MONOCYTES # BLD AUTO: 0.34 K/UL
MONOCYTES NFR BLD AUTO: 4.1 %
NEUTROPHILS # BLD AUTO: 6.58 K/UL
NEUTROPHILS NFR BLD AUTO: 78.6 %
PLATELET # BLD AUTO: 343 K/UL
POTASSIUM SERPL-SCNC: 4.7 MMOL/L
PROT SERPL-MCNC: 7.7 G/DL
RBC # BLD: 4.68 M/UL
RBC # FLD: 13.1 %
SODIUM SERPL-SCNC: 142 MMOL/L
T3 SERPL-MCNC: 114 NG/DL
T4 FREE SERPL-MCNC: 1.5 NG/DL
THYROGLOB AB SERPL-ACNC: 18.4 IU/ML
THYROPEROXIDASE AB SERPL IA-ACNC: 14.1 IU/ML
TSH RECEPTOR AB: <1.1 IU/L
TSH SERPL-ACNC: 0.92 UIU/ML
WBC # FLD AUTO: 8.37 K/UL

## 2024-10-09 PROCEDURE — 92012 INTRM OPH EXAM EST PATIENT: CPT | Performed by: OPHTHALMOLOGY

## 2024-10-09 ASSESSMENT — REFRACTION_MANIFEST
OS_VA1: 20/20
OS_CYLINDER: +0.50
OD_VA1: 20/20
OS_AXIS: 060
OD_AXIS: 095
OS_SPHERE: -3.50
OD_SPHERE: -3.25
OD_CYLINDER: +0.75

## 2024-10-09 ASSESSMENT — LID EXAM ASSESSMENTS
OD_BLEPHARITIS: RUL 2+
OS_BLEPHARITIS: LUL 2+

## 2024-10-09 ASSESSMENT — REFRACTION_AUTOREFRACTION
OS_AXIS: 070
OS_CYLINDER: +0.25
OD_CYLINDER: +0.50
OD_SPHERE: -3.50
OD_AXIS: 105
OS_SPHERE: -3.50

## 2024-10-09 ASSESSMENT — REFRACTION_CURRENTRX
OS_CYLINDER: +0.50
OD_SPHERE: -3.75
OS_OVR_VA: 20/
OD_CYLINDER: +0.75
OS_SPHERE: -3.50
OD_OVR_VA: 20/
OD_AXIS: 109
OS_AXIS: 079

## 2024-10-09 ASSESSMENT — CONFRONTATIONAL VISUAL FIELD TEST (CVF)
OS_FINDINGS: FULL
OD_FINDINGS: FULL

## 2024-10-09 ASSESSMENT — KERATOMETRY
OS_AXISANGLE_DEGREES: 091
OD_K1POWER_DIOPTERS: 43.50
OS_K1POWER_DIOPTERS: 44.50
OD_AXISANGLE_DEGREES: 093
OD_K2POWER_DIOPTERS: 44.75
OS_K2POWER_DIOPTERS: 45.50
METHOD_AUTO_MANUAL: AUTO

## 2024-10-09 ASSESSMENT — TONOMETRY
OS_IOP_MMHG: 14
OD_IOP_MMHG: 12

## 2024-10-09 ASSESSMENT — VISUAL ACUITY
OS_BCVA: 20/20
OD_BCVA: 20/20-2

## 2024-10-09 ASSESSMENT — SUPERFICIAL PUNCTATE KERATITIS (SPK)
OS_SPK: ABSENT
OD_SPK: ABSENT

## 2024-10-09 ASSESSMENT — VASCULARIZATION
OS_VASCULARIZATION: PANNUS
OD_VASCULARIZATION: PANNUS

## 2024-10-10 LAB — TSI ACT/NOR SER: <0.1 IU/L

## 2024-10-12 ENCOUNTER — OFFICE (OUTPATIENT)
Age: 39
Setting detail: OPHTHALMOLOGY
End: 2024-10-12
Payer: COMMERCIAL

## 2024-10-12 DIAGNOSIS — H01.004: ICD-10-CM

## 2024-10-12 DIAGNOSIS — H01.001: ICD-10-CM

## 2024-10-12 DIAGNOSIS — H10.45: ICD-10-CM

## 2024-10-12 PROBLEM — H16.223 DRY EYE SYNDROME K SICCA; BOTH EYES: Status: ACTIVE | Noted: 2024-10-12

## 2024-10-12 PROBLEM — B88.0 ACARIASIS, INFESTATION OF MITES AND OR TICS: Status: ACTIVE | Noted: 2024-10-12

## 2024-10-12 PROCEDURE — 99213 OFFICE O/P EST LOW 20 MIN: CPT | Performed by: OPHTHALMOLOGY

## 2024-10-12 ASSESSMENT — KERATOMETRY
OD_K1POWER_DIOPTERS: 43.50
OD_AXISANGLE_DEGREES: 092
OD_K2POWER_DIOPTERS: 44.75
METHOD_AUTO_MANUAL: AUTO
OS_AXISANGLE_DEGREES: 089
OS_K2POWER_DIOPTERS: 45.50
OS_K1POWER_DIOPTERS: 44.50

## 2024-10-12 ASSESSMENT — REFRACTION_AUTOREFRACTION
OS_SPHERE: -3.50
OS_AXIS: 166
OS_CYLINDER: -0.50
OD_SPHERE: -2.75
OD_CYLINDER: -0.75
OD_AXIS: 013

## 2024-10-12 ASSESSMENT — REFRACTION_MANIFEST
OS_VA1: 20/20
OD_VA1: 20/20
OD_CYLINDER: +0.75
OD_AXIS: 095
OS_CYLINDER: +0.50
OS_SPHERE: -3.50
OD_SPHERE: -3.25
OS_AXIS: 060

## 2024-10-12 ASSESSMENT — REFRACTION_CURRENTRX
OS_OVR_VA: 20/
OD_CYLINDER: +0.75
OD_SPHERE: -3.75
OS_CYLINDER: +0.50
OS_SPHERE: -3.50
OD_AXIS: 109
OD_OVR_VA: 20/
OS_AXIS: 079

## 2024-10-12 ASSESSMENT — VASCULARIZATION
OD_VASCULARIZATION: PANNUS
OS_VASCULARIZATION: PANNUS

## 2024-10-12 ASSESSMENT — TEAR BREAK UP TIME (TBUT)
OD_TBUT: T
OS_TBUT: T

## 2024-10-12 ASSESSMENT — CONFRONTATIONAL VISUAL FIELD TEST (CVF)
OD_FINDINGS: FULL
OS_FINDINGS: FULL

## 2024-10-12 ASSESSMENT — TONOMETRY
OS_IOP_MMHG: 15
OD_IOP_MMHG: 17

## 2024-10-12 ASSESSMENT — VISUAL ACUITY
OD_BCVA: 20/25
OS_BCVA: 20/20-

## 2024-10-12 ASSESSMENT — LID EXAM ASSESSMENTS
OD_BLEPHARITIS: RUL 2+
OS_BLEPHARITIS: LUL 2+

## 2024-10-14 ENCOUNTER — APPOINTMENT (OUTPATIENT)
Dept: ALLERGY | Facility: CLINIC | Age: 39
End: 2024-10-14
Payer: COMMERCIAL

## 2024-10-14 PROCEDURE — 95044 PATCH/APPLICATION TESTS: CPT

## 2024-10-16 ENCOUNTER — TRANSCRIPTION ENCOUNTER (OUTPATIENT)
Age: 39
End: 2024-10-16

## 2024-10-16 ENCOUNTER — APPOINTMENT (OUTPATIENT)
Dept: ALLERGY | Facility: CLINIC | Age: 39
End: 2024-10-16
Payer: COMMERCIAL

## 2024-10-16 PROCEDURE — 99212 OFFICE O/P EST SF 10 MIN: CPT

## 2024-10-17 ENCOUNTER — TRANSCRIPTION ENCOUNTER (OUTPATIENT)
Age: 39
End: 2024-10-17

## 2024-10-17 ENCOUNTER — APPOINTMENT (OUTPATIENT)
Dept: ALLERGY | Facility: CLINIC | Age: 39
End: 2024-10-17
Payer: COMMERCIAL

## 2024-10-17 PROCEDURE — 99213 OFFICE O/P EST LOW 20 MIN: CPT

## 2024-11-05 ENCOUNTER — OFFICE (OUTPATIENT)
Age: 39
Setting detail: OPHTHALMOLOGY
End: 2024-11-05
Payer: COMMERCIAL

## 2024-11-05 DIAGNOSIS — H16.223: ICD-10-CM

## 2024-11-05 DIAGNOSIS — H01.004: ICD-10-CM

## 2024-11-05 DIAGNOSIS — H01.001: ICD-10-CM

## 2024-11-05 DIAGNOSIS — B88.0: ICD-10-CM

## 2024-11-05 PROCEDURE — 92012 INTRM OPH EXAM EST PATIENT: CPT | Performed by: OPHTHALMOLOGY

## 2024-11-05 ASSESSMENT — KERATOMETRY
OS_K2POWER_DIOPTERS: 45.50
METHOD_AUTO_MANUAL: AUTO
OD_K2POWER_DIOPTERS: 44.75
OD_K1POWER_DIOPTERS: 43.50
OS_AXISANGLE_DEGREES: 089
OS_K1POWER_DIOPTERS: 44.50
OD_AXISANGLE_DEGREES: 092

## 2024-11-05 ASSESSMENT — VASCULARIZATION
OS_VASCULARIZATION: PANNUS
OD_VASCULARIZATION: PANNUS

## 2024-11-05 ASSESSMENT — VISUAL ACUITY
OD_BCVA: 20/20-1
OS_BCVA: 20/20-1

## 2024-11-05 ASSESSMENT — REFRACTION_MANIFEST
OD_SPHERE: -3.25
OS_AXIS: 060
OS_SPHERE: -3.50
OD_VA1: 20/20
OS_VA1: 20/20
OS_CYLINDER: +0.50
OD_AXIS: 095
OD_CYLINDER: +0.75

## 2024-11-05 ASSESSMENT — REFRACTION_CURRENTRX
OS_OVR_VA: 20/
OD_AXIS: 109
OS_CYLINDER: +0.50
OD_OVR_VA: 20/
OS_AXIS: 079
OD_CYLINDER: +0.75
OD_AXIS: 003
OS_OVR_VA: 20/
OS_AXIS: 168
OD_SPHERE: -3.75
OD_CYLINDER: -0.50
OD_OVR_VA: 20/
OS_SPHERE: -3.00
OS_CYLINDER: -0.50
OD_SPHERE: -3.00
OS_SPHERE: -3.50

## 2024-11-05 ASSESSMENT — REFRACTION_AUTOREFRACTION
OD_SPHERE: -2.75
OD_CYLINDER: -0.75
OS_AXIS: 166
OS_SPHERE: -3.50
OS_CYLINDER: -0.50
OD_AXIS: 013

## 2024-11-05 ASSESSMENT — TEAR BREAK UP TIME (TBUT)
OD_TBUT: T
OS_TBUT: T

## 2024-11-05 ASSESSMENT — CONFRONTATIONAL VISUAL FIELD TEST (CVF)
OS_FINDINGS: FULL
OD_FINDINGS: FULL

## 2024-11-05 ASSESSMENT — LID EXAM ASSESSMENTS
OD_BLEPHARITIS: RUL 2+
OS_BLEPHARITIS: LUL 2+

## 2024-11-13 ENCOUNTER — APPOINTMENT (OUTPATIENT)
Dept: SURGICAL ONCOLOGY | Facility: CLINIC | Age: 39
End: 2024-11-13

## 2024-11-15 ENCOUNTER — TRANSCRIPTION ENCOUNTER (OUTPATIENT)
Age: 39
End: 2024-11-15

## 2024-11-18 ENCOUNTER — TRANSCRIPTION ENCOUNTER (OUTPATIENT)
Age: 39
End: 2024-11-18

## 2024-12-20 ENCOUNTER — OFFICE (OUTPATIENT)
Facility: LOCATION | Age: 39
Setting detail: OPHTHALMOLOGY
End: 2024-12-20
Payer: COMMERCIAL

## 2024-12-20 DIAGNOSIS — H01.001: ICD-10-CM

## 2024-12-20 DIAGNOSIS — B88.0: ICD-10-CM

## 2024-12-20 DIAGNOSIS — H16.223: ICD-10-CM

## 2024-12-20 DIAGNOSIS — H01.004: ICD-10-CM

## 2024-12-20 PROCEDURE — 92012 INTRM OPH EXAM EST PATIENT: CPT | Performed by: OPHTHALMOLOGY

## 2024-12-20 ASSESSMENT — KERATOMETRY
OD_AXISANGLE_DEGREES: 093
OS_K1POWER_DIOPTERS: 44.50
METHOD_AUTO_MANUAL: AUTO
OS_K2POWER_DIOPTERS: 45.25
OD_K2POWER_DIOPTERS: 44.75
OS_AXISANGLE_DEGREES: 089
OD_K1POWER_DIOPTERS: 43.50

## 2024-12-20 ASSESSMENT — VISUAL ACUITY
OS_BCVA: 20/20
OD_BCVA: 20/20

## 2024-12-20 ASSESSMENT — VASCULARIZATION
OD_VASCULARIZATION: PANNUS
OS_VASCULARIZATION: PANNUS

## 2024-12-20 ASSESSMENT — REFRACTION_MANIFEST
OD_AXIS: 095
OD_VA1: 20/20
OS_CYLINDER: +0.50
OS_AXIS: 060
OD_SPHERE: -3.25
OS_VA1: 20/20
OS_SPHERE: -3.50
OD_CYLINDER: +0.75

## 2024-12-20 ASSESSMENT — CONFRONTATIONAL VISUAL FIELD TEST (CVF)
OS_FINDINGS: FULL
OD_FINDINGS: FULL

## 2024-12-20 ASSESSMENT — LID EXAM ASSESSMENTS
OS_BLEPHARITIS: LUL 2+
OD_BLEPHARITIS: RUL 2+

## 2024-12-20 ASSESSMENT — REFRACTION_AUTOREFRACTION
OD_AXIS: 016
OD_CYLINDER: -0.75
OD_SPHERE: -3.00
OS_SPHERE: -3.25
OS_AXIS: 177
OS_CYLINDER: -0.50

## 2024-12-20 ASSESSMENT — REFRACTION_CURRENTRX
OS_SPHERE: -3.00
OS_CYLINDER: -0.50
OS_SPHERE: -3.00
OD_AXIS: 009
OS_AXIS: 167
OD_CYLINDER: -0.50
OD_OVR_VA: 20/
OD_SPHERE: -3.00
OS_VPRISM_DIRECTION: SV
OS_CYLINDER: -0.50
OD_SPHERE: -3.00
OS_AXIS: 168
OD_CYLINDER: -0.50
OD_VPRISM_DIRECTION: SV
OD_OVR_VA: 20/
OD_AXIS: 003
OS_OVR_VA: 20/
OS_OVR_VA: 20/

## 2024-12-20 ASSESSMENT — TEAR BREAK UP TIME (TBUT)
OS_TBUT: T
OD_TBUT: T

## 2024-12-20 ASSESSMENT — TONOMETRY
OD_IOP_MMHG: 13
OS_IOP_MMHG: 13

## 2025-01-10 NOTE — OB RN TRIAGE NOTE - INTERNATIONAL TRAVEL
FMLA paperwork in mailbox to completed  
Patient calling in to check status of paperwork - advised that they are still pending completion. Verbalized understanding.  
No

## 2025-02-06 ENCOUNTER — TRANSCRIPTION ENCOUNTER (OUTPATIENT)
Age: 40
End: 2025-02-06

## 2025-02-07 ENCOUNTER — TRANSCRIPTION ENCOUNTER (OUTPATIENT)
Age: 40
End: 2025-02-07

## 2025-02-09 LAB
CRP SERPL-MCNC: <3 MG/L
ERYTHROCYTE [SEDIMENTATION RATE] IN BLOOD BY WESTERGREN METHOD: 2 MM/HR
T3 SERPL-MCNC: 95 NG/DL
T4 FREE SERPL-MCNC: 1.3 NG/DL
TSH SERPL-ACNC: 1.01 UIU/ML

## 2025-02-10 ENCOUNTER — TRANSCRIPTION ENCOUNTER (OUTPATIENT)
Age: 40
End: 2025-02-10

## 2025-03-13 NOTE — PATIENT PROFILE OB - NS PRO TALK SOMEONE YN
1st attempt to r/s missed appt. LVM    Electronically signed by Berta Díaz on 2/13/2025 at 10:10 AM    
2nd attempt to r/s missed appt. LVM    Electronically signed by Berta Díaz on 2/27/2025 at 10:43 AM    
3rd and final attempt to r.s missed appt. Pt stated that, \"he's already had the surgery at [facility].\"    Electronically signed by Berta Díaz on 3/13/2025 at 1:30 PM    
unable to assess

## 2025-03-29 ENCOUNTER — NON-APPOINTMENT (OUTPATIENT)
Age: 40
End: 2025-03-29

## 2025-04-01 ENCOUNTER — APPOINTMENT (OUTPATIENT)
Dept: CARDIOLOGY | Facility: CLINIC | Age: 40
End: 2025-04-01
Payer: COMMERCIAL

## 2025-04-01 ENCOUNTER — NON-APPOINTMENT (OUTPATIENT)
Age: 40
End: 2025-04-01

## 2025-04-01 VITALS
BODY MASS INDEX: 18.83 KG/M2 | DIASTOLIC BLOOD PRESSURE: 68 MMHG | HEIGHT: 67 IN | WEIGHT: 120 LBS | HEART RATE: 97 BPM | SYSTOLIC BLOOD PRESSURE: 122 MMHG | OXYGEN SATURATION: 99 %

## 2025-04-01 DIAGNOSIS — R00.2 PALPITATIONS: ICD-10-CM

## 2025-04-01 PROCEDURE — 93000 ELECTROCARDIOGRAM COMPLETE: CPT | Mod: 59

## 2025-04-01 PROCEDURE — 99213 OFFICE O/P EST LOW 20 MIN: CPT | Mod: 25

## 2025-04-10 ENCOUNTER — APPOINTMENT (OUTPATIENT)
Dept: INTERNAL MEDICINE | Facility: CLINIC | Age: 40
End: 2025-04-10

## 2025-04-10 PROCEDURE — 93306 TTE W/DOPPLER COMPLETE: CPT

## 2025-04-21 ENCOUNTER — APPOINTMENT (OUTPATIENT)
Dept: ULTRASOUND IMAGING | Facility: CLINIC | Age: 40
End: 2025-04-21

## 2025-08-25 ENCOUNTER — NON-APPOINTMENT (OUTPATIENT)
Age: 40
End: 2025-08-25